# Patient Record
Sex: FEMALE | Race: WHITE | Employment: UNEMPLOYED | URBAN - METROPOLITAN AREA
[De-identification: names, ages, dates, MRNs, and addresses within clinical notes are randomized per-mention and may not be internally consistent; named-entity substitution may affect disease eponyms.]

---

## 2017-07-24 ENCOUNTER — HOSPITAL ENCOUNTER (OUTPATIENT)
Dept: PHYSICAL THERAPY | Age: 57
Discharge: HOME | End: 2017-07-24
Payer: SELF-PAY

## 2017-07-24 NOTE — PROGRESS NOTES
Xiomara Song  : 1960 [facility]  [address], [address], [place] [address]  [phone]   [phone]        OUTPATIENT DAILY NOTE    NAME/AGE/GENDER: Xiomara Song is a 64 y.o. female. DATE: 2017    This initial visit was canceled because patient is going to go to [facility] clinic instead and re-scheduled her evaluation there.     Roosevelt Lama, PT

## 2017-07-25 ENCOUNTER — HOSPITAL ENCOUNTER (OUTPATIENT)
Dept: PHYSICAL THERAPY | Age: 57
Discharge: HOME | End: 2017-07-25
Payer: SELF-PAY

## 2017-07-25 PROCEDURE — 97110 THERAPEUTIC EXERCISES: CPT

## 2017-07-25 PROCEDURE — 97161 PT EVAL LOW COMPLEX 20 MIN: CPT

## 2017-07-25 NOTE — PROGRESS NOTES
Allison Wu  : 1960 [address] [facility] at [address] [place] [address], [address]  [phone]   [phone]         OUTPATIENT PHYSICAL THERAPY:Initial Assessment 2017    ICD-10: Treatment Diagnosis: low back pain (M54.5); Radiculopathy, lumbar region (M54.16); difficulty walking (R26.2)  Precautions/Allergies:   Review of patient's allergies indicates not on file. Fall Risk Score: 1 (? 5 = High Risk)  MD Orders: evaluate and treat MEDICAL/REFERRING DIAGNOSIS:  Radiculopathy, lumbar region [M54.16]   DATE OF ONSET: surgery 17  REFERRING PHYSICIAN: Unknown, Provider, MD  RETURN PHYSICIAN APPOINTMENT: 2017     INITIAL ASSESSMENT:  Ms. Megan Muñoz presents with functional limitations due to lumbar pain and stiffness following discectomy 17. PT evaluation reveals weakness of core musculature, poor postural alignment, and lumbar tissue restriction. Pt states that she recently fell onto her low back (post surgery) reporting soreness from this incident. No radicular symptoms or signs and no signs observed that would suggest significant injury to surgical sight. She would highly benefit from skilled PT to address problems below and reach maximum functional mobility. PROBLEM LIST (Impacting functional limitations):  1. Decreased Strength  2. Decreased ADL/Functional Activities  3. Increased Pain  4. Decreased Flexibility/Joint Mobility  5. Decreased [place] with Home Exercise Program INTERVENTIONS PLANNED:  1. Balance Exercise  2. Bed Mobility  3. Cold  4. Gait Training  5. Heat  6. Home Exercise Program (HEP)  7. Manual Therapy  8. Neuromuscular Re-education/Strengthening  9. Range of Motion (ROM)  10. Therapeutic Exercise/Strengthening   TREATMENT PLAN:  Effective Dates: 17 TO 17.   Frequency/Duration: 2 times a week for 4-8 weeks  GOALS: (Goals have been discussed and agreed upon with patient.)  SHORT-TERM FUNCTIONAL GOALS: Time Frame: 2-4 weeks   1. Pt will be independent with HEP focusing on core stability and promoting good spinal alignment. 2. Pt will demonstrate good standing and sitting postural alignment with min to no cuing needed. 3. Pt will report pain level does not exceed 4/10 in a 1-2 week period of time to demonstrate pain management. 4. Pt will be educated on body mechanics during ADLs and IADLs to decrease stress upon low back during in home activities. 5. Pt will no longer use back brace for normal daily activities (may still use for strenuous activities). DISCHARGE GOALS: Time Frame: 6-8 weeks   1. Pt will improve Oswestry score by at least 10 points. 2. Pt will be able to sit to stand 5/5 times with no use of UE.   3. Pt will demonstrate improved AROM of lumbar spine all planes by at least 25% without report of pain to improve functional mobility. 3. Pt will be able to sustain regular exercise routine including walking for recreation 3 + times per week with no limitations due to lumbar pain. 4. Pt will demonstrate at least 4+/5 bilateral hip and TrA strength. Rehabilitation Potential For Stated Goals: Good  Regarding Sultana Velásquez's therapy, I certify that the treatment plan above will be carried out by a therapist or under their direction. Thank you for this referral,  Patrick Estevez, PT     Referring Physician Signature: Unknown, Provider, MD              Date                    The information in this section was collected on 7/25/17 (except where otherwise noted). HISTORY:   History of Present Injury/Illness (Reason for Referral):  Pt states 9 months chronic LBP right LE pain, treated with PT and injections prior to surgery on 6/28/17. No LE pain and min to mod back pain post surgery. Pt reports a fall while reaching for something in her closet 4 days ago, landing on back. She states significant low to mid back soreness since fall. She informed surgeon of fall.   No immediate signs of significant injury. Present symptoms (on day of initial evaluation): dull aching of low and mid back    · Aggravating factors: walking short distances, bending forward, sitting prolonged, sit to stand    · Relieving factors: hydrocodone, ice, and heat, wearing soft back brace   · Pain level: 5-6/10 presently, 10/10 worst, 2/10 best     Past Medical History/Comorbidities:   Anxiety, OA, pacemaker  Social History/Living Environment:     lives with spouse one story home   Prior Level of Function/Work/Activity:   (unable to work due to pain of low back pain)  Dominant Side:         RIGHT  Other Clinical Tests:          CT scan   Previous Treatment Approaches:          PT     Current Medications:     No current outpatient prescriptions on file. Date Last Reviewed:  7/25/2017     Number of Personal Factors/Comorbidities that affect the Plan of Care: 1-2: MODERATE COMPLEXITY   EXAMINATION:   Observation/Orthostatic Postural Assessment:    · Poor posture overall  · Flattened lumbar spine  · Posterior pelvic tilt   · Tends to keep B hips flexed with slight tilt forward during standing position  · Tends to lock knees into extension with standing activities.     · Wears heel lift for leg length discrepency     Palpation:    ·       MF tightness noted all directions superficial layers lumbar region   ROM:            Lumbar spine Date:  7/25/17 Date:   Date:     Direction  Parameters Parameters Parameters   Flexion  50%     Extension  nuetral cs     Rotation  R: 25% cs  L: 25%     Side bending  R: 25% cs LE  L: 25%      Hip IR R: mildly limited   L: mildly limited      Hip ER R: mildly limited   L: mildly limited              Strength:            Lower quadrant    DATE  7/25/17 DATE     Hip flexion R: 4  L: 4 R:   L:    Hip Abduction  R: 4  L: 4 R:   L:    Hip Extension  R: 4  L: 4 R:   L:    Knee Flexion  R: 4+  L: 4+ R:   L:    Knee Extension  R: 4+  L: 4+ R:   L:    Ankle Dorsiflexion  R: 4+  L: 4+ R:   L: Ankle Plantarflexion  R: 4+  L: 4+ R:  L:          Special Tests:          None   Neurological Screen:        Normal   Functional Mobility:         Gait/Ambulation:  Short step length B, decreased hip extension B         Transfers: heavy use B UE         Bed Mobility:  independent slow        Stairs:  Not tested today    Body Structures Involved:  1. Joints  2. Muscles Body Functions Affected:  1. Sensory/Pain  2. Neuromusculoskeletal  3. Movement Related Activities and Participation Affected:  1. Mobility  2. Self Care   Number of elements that affect the Plan of Care: 3: MODERATE COMPLEXITY   CLINICAL PRESENTATION:   Presentation: Stable and uncomplicated: LOW COMPLEXITY   CLINICAL DECISION MAKING:   Outcome Measure: Tool Used: Modified Oswestry Low Back Pain Questionnaire  Score:  Initial: 25/50  Most Recent: X/50 (Date: -- )   Interpretation of Score: Each section is scored on a 0-5 scale, 5 representing the greatest disability. The scores of each section are added together for a total score of 50. Medical Necessity:   · Patient is expected to demonstrate progress in strength, range of motion and functional technique to increase independence with self care and recreational activities. Reason for Services/Other Comments:  · Patient continues to require modification of therapeutic interventions to increase complexity of exercises. Use of outcome tool(s) and clinical judgement create a POC that gives a: Clear prediction of patient's progress: LOW COMPLEXITY            TREATMENT:   (In addition to Assessment/Re-Assessment sessions the following treatments were rendered)  Pre-treatment Symptoms/Complaints:  See above   Pain: Initial:     5-6/10 Post Session:  5/10     THERAPEUTIC EXERCISE: (10 minutes):  Exercises per grid below to improve mobility, strength, balance and coordination.   Required moderate visual, verbal, manual and tactile cues to promote proper body alignment, promote proper body posture, promote proper body mechanics and promote proper body breathing techniques. Progressed resistance, range, repetitions and complexity of movement as indicated. Date:  7/25/17 Date:   Date:     Activity/Exercise Parameters Parameters Parameters   multif (heel and elbow press) 5 sec x 5      iso hip flexor  5 sec x 5 (SL)     nustep/treadmill Next visit     bridge Next visit                            Treatment/Session Assessment:    · Response to Treatment:  Good understanding of HEP. · Compliance with Program/Exercises: Will assess as treatment progresses. · Recommendations/Intent for next treatment session: \"Next visit will focus on advancements to more challenging activities\".     Future Appointments  Date Time Provider Jennifer Chinchilla   7/27/2017 8:00 AM Whitney Ramirez PT [facility]   7/31/2017 8:00 AM IVETTE BernabeOSRPSUZE [facility]   8/2/2017 8:00 AM Whitney Ramirez PT [facility]       Total Treatment Duration:  PT Patient Time In/Time Out  Time In: 0800  Time Out: 0900    Whitney Ramirez PT

## 2017-07-26 NOTE — PROGRESS NOTES
Ambulatory/Rehab Services H2 Model Falls Risk Assessment    Risk Factor Pts. ·   Confusion/Disorientation/Impulsivity  []    4 ·   Symptomatic Depression  []   2 ·   Altered Elimination  []   1 ·   Dizziness/Vertigo  []   1 ·   Gender (Male)  []   1 ·   Any administered antiepileptics (anticonvulsants):  []   2 ·   Any administered benzodiazepines:  []   1 ·   Visual Impairment (specify):  []   1 ·   Portable Oxygen Use  []   1 ·   Orthostatic ? BP  []   1 ·   History of Recent Falls (within 3 mos.)  []   5     Ability to Rise from Chair (choose one) Pts. ·   Ability to rise in a single movement  []   0 ·   Pushes up, successful in one attempt  [x]   1 ·   Multiple attempts, but successful  []   3 ·   Unable to rise without assistance  []   4   Total: (5 or greater = High Risk) 1     Falls Prevention Plan:   []                Physical Limitations to Exercise (specify):   []                Mobility Assistance Device (type):   []                Exercise/Equipment Adaptation (specify):    ©2010 [facility] of [address] Patent #[id number].  Federal Law prohibits the replication, distribution or use without written permission from [facility]   '

## 2017-07-27 ENCOUNTER — HOSPITAL ENCOUNTER (OUTPATIENT)
Dept: PHYSICAL THERAPY | Age: 57
Discharge: HOME | End: 2017-07-27
Payer: SELF-PAY

## 2017-07-27 PROCEDURE — 97110 THERAPEUTIC EXERCISES: CPT

## 2017-07-27 NOTE — PROGRESS NOTES
Patricio Roche  : 1960 [address] at [address] [place] [address], [facility], [address] [place] [address]  [phone]   [phone]         OUTPATIENT PHYSICAL THERAPY:Daily Note 2017    ICD-10: Treatment Diagnosis: low back pain (M54.5); Radiculopathy, lumbar region (M54.16); difficulty walking (R26.2)  Precautions/Allergies:   Review of patient's allergies indicates not on file. Fall Risk Score: 1 (? 5 = High Risk)  MD Orders: evaluate and treat MEDICAL/REFERRING DIAGNOSIS:  Radiculopathy, lumbar region [M54.16]   DATE OF ONSET: surgery 17  REFERRING PHYSICIAN: Unknown, Provider, MD  RETURN PHYSICIAN APPOINTMENT: 2017     INITIAL ASSESSMENT:  Ms. Rajesh Reilly presents with functional limitations due to lumbar pain and stiffness following discectomy 17. PT evaluation reveals weakness of core musculature, poor postural alignment, and lumbar tissue restriction. Pt states that she recently fell onto her low back (post surgery) reporting soreness from this incident. No radicular symptoms or signs and no signs observed that would suggest significant injury to surgical sight. She would highly benefit from skilled PT to address problems below and reach maximum functional mobility. PROBLEM LIST (Impacting functional limitations):  1. Decreased Strength  2. Decreased ADL/Functional Activities  3. Increased Pain  4. Decreased Flexibility/Joint Mobility  5. Decreased [place] with Home Exercise Program INTERVENTIONS PLANNED:  1. Balance Exercise  2. Bed Mobility  3. Cold  4. Gait Training  5. Heat  6. Home Exercise Program (HEP)  7. Manual Therapy  8. Neuromuscular Re-education/Strengthening  9. Range of Motion (ROM)  10. Therapeutic Exercise/Strengthening   TREATMENT PLAN:  Effective Dates: 17 TO 17.   Frequency/Duration: 2 times a week for 4-8 weeks  GOALS: (Goals have been discussed and agreed upon with patient.)  SHORT-TERM FUNCTIONAL GOALS: Time Frame: 2-4 weeks   1. Pt will be independent with HEP focusing on core stability and promoting good spinal alignment. 2. Pt will demonstrate good standing and sitting postural alignment with min to no cuing needed. 3. Pt will report pain level does not exceed 4/10 in a 1-2 week period of time to demonstrate pain management. 4. Pt will be educated on body mechanics during ADLs and IADLs to decrease stress upon low back during in home activities. 5. Pt will no longer use back brace for normal daily activities (may still use for strenuous activities). DISCHARGE GOALS: Time Frame: 6-8 weeks   1. Pt will improve Oswestry score by at least 10 points. 2. Pt will be able to sit to stand 5/5 times with no use of UE.   3. Pt will demonstrate improved AROM of lumbar spine all planes by at least 25% without report of pain to improve functional mobility. 3. Pt will be able to sustain regular exercise routine including walking for recreation 3 + times per week with no limitations due to lumbar pain. 4. Pt will demonstrate at least 4+/5 bilateral hip and TrA strength. Rehabilitation Potential For Stated Goals: Good  Regarding Sultana Velásquez's therapy, I certify that the treatment plan above will be carried out by a therapist or under their direction. Thank you for this referral,  Jacky Lindsey, PT     Referring Physician Signature: Unknown, Provider, MD              Date                    The information in this section was collected on 7/25/17 (except where otherwise noted). HISTORY:   History of Present Injury/Illness (Reason for Referral):  Pt states 9 months chronic LBP right LE pain, treated with PT and injections prior to surgery on 6/28/17. No LE pain and min to mod back pain post surgery. Pt reports a fall while reaching for something in her closet 4 days ago, landing on back. She states significant low to mid back soreness since fall. She informed surgeon of fall.   No immediate signs of significant injury. Present symptoms (on day of initial evaluation): dull aching of low and mid back    · Aggravating factors: walking short distances, bending forward, sitting prolonged, sit to stand    · Relieving factors: hydrocodone, ice, and heat, wearing soft back brace   · Pain level: 5-6/10 presently, 10/10 worst, 2/10 best     Past Medical History/Comorbidities:   Anxiety, OA, pacemaker  Social History/Living Environment:     lives with spouse one story home   Prior Level of Function/Work/Activity:   (unable to work due to pain of low back pain)  Dominant Side:         RIGHT  Other Clinical Tests:          CT scan   Previous Treatment Approaches:          PT     Current Medications:     No current outpatient prescriptions on file. Date Last Reviewed:  7/27/2017     Number of Personal Factors/Comorbidities that affect the Plan of Care: 1-2: MODERATE COMPLEXITY   EXAMINATION:   Observation/Orthostatic Postural Assessment:    · Poor posture overall  · Flattened lumbar spine  · Posterior pelvic tilt   · Tends to keep B hips flexed with slight tilt forward during standing position  · Tends to lock knees into extension with standing activities.     · Wears heel lift for leg length discrepency     Palpation:    ·       MF tightness noted all directions superficial layers lumbar region   ROM:            Lumbar spine Date:  7/25/17 Date:   Date:     Direction  Parameters Parameters Parameters   Flexion  50%     Extension  nuetral cs     Rotation  R: 25% cs  L: 25%     Side bending  R: 25% cs LE  L: 25%      Hip IR R: mildly limited   L: mildly limited      Hip ER R: mildly limited   L: mildly limited              Strength:            Lower quadrant    DATE  7/25/17 DATE     Hip flexion R: 4  L: 4 R:   L:    Hip Abduction  R: 4  L: 4 R:   L:    Hip Extension  R: 4  L: 4 R:   L:    Knee Flexion  R: 4+  L: 4+ R:   L:    Knee Extension  R: 4+  L: 4+ R:   L:    Ankle Dorsiflexion  R: 4+  L: 4+ R:   L:   Ankle Plantarflexion  R: 4+  L: 4+ R:  L:          Special Tests:          None   Neurological Screen:        Normal   Functional Mobility:         Gait/Ambulation:  Short step length B, decreased hip extension B         Transfers: heavy use B UE         Bed Mobility:  independent slow        Stairs:  Not tested today    Body Structures Involved:  1. Joints  2. Muscles Body Functions Affected:  1. Sensory/Pain  2. Neuromusculoskeletal  3. Movement Related Activities and Participation Affected:  1. Mobility  2. Self Care   Number of elements that affect the Plan of Care: 3: MODERATE COMPLEXITY   CLINICAL PRESENTATION:   Presentation: Stable and uncomplicated: LOW COMPLEXITY   CLINICAL DECISION MAKING:   Outcome Measure: Tool Used: Modified Oswestry Low Back Pain Questionnaire  Score:  Initial: 25/50  Most Recent: X/50 (Date: -- )   Interpretation of Score: Each section is scored on a 0-5 scale, 5 representing the greatest disability. The scores of each section are added together for a total score of 50. Medical Necessity:   · Patient is expected to demonstrate progress in strength, range of motion and functional technique to increase independence with self care and recreational activities. Reason for Services/Other Comments:  · Patient continues to require modification of therapeutic interventions to increase complexity of exercises. TREATMENT:   (In addition to Assessment/Re-Assessment sessions the following treatments were rendered)  Pre-treatment Symptoms/Complaints:  Pt states that she is has been sore from exercises of HEP. She states due to soreness, she took a muscle relaxer this AM (3 hours ago) and came in noticeably very groggy. Pain: Initial:     6/10 Post Session:  5/10     THERAPEUTIC EXERCISE: (40 minutes):  Exercises per grid below to improve mobility, strength, balance and coordination.   Required moderate visual, verbal, manual and tactile cues to promote proper body alignment, promote proper body posture, promote proper body mechanics and promote proper body breathing techniques. Progressed resistance, range, repetitions and complexity of movement as indicated. Date:  7/25/17 Date:  7/27/17 Date:     Activity/Exercise Parameters Parameters Parameters   multif (heel and elbow press) 5 sec x 5  5 x 5 sec     iso hip flexor  5 sec x 5 (SL) X 2 B     nustep/treadmill Next visit X 10 min very slow, no resist      bridge Next visit  Next visit     Marching    X 1 lap slowly     Side stepping   X 1 lap    LTR  X 6 with assist     Modalities:   ice of lumbar region in supine to decrease inflammation x 10 min     Treatment/Session Assessment:  Required cuing with most exercises today. Very groggy from medication today. Advised against pt taking muscle relaxers before therapy sessions. · Response to Treatment: decreased pain level after session   · Compliance with Program/Exercises: Will assess as treatment progresses.   · Recommendations/Intent for next treatment session: continue standing exercises, manual     Future Appointments  Date Time Provider Jennifer Chinchilla   7/31/2017 8:00 AM IVETTE Ariza   8/2/2017 8:00 AM IVETTE Ariza       Total Treatment Duration:  PT Patient Time In/Time Out  Time In: 0800  Time Out: 0900    Nathaniel Doty PT

## 2017-07-31 ENCOUNTER — HOSPITAL ENCOUNTER (OUTPATIENT)
Dept: PHYSICAL THERAPY | Age: 57
Discharge: HOME | End: 2017-07-31
Payer: SELF-PAY

## 2017-07-31 PROCEDURE — 97110 THERAPEUTIC EXERCISES: CPT

## 2017-07-31 PROCEDURE — 97140 MANUAL THERAPY 1/> REGIONS: CPT

## 2017-07-31 NOTE — PROGRESS NOTES
Bambi Mode  : 1960 [address] [facility] at [address] [place] [address] [address] [place] [address], [facility], [address] [place] [address]  [phone]   [phone]         OUTPATIENT PHYSICAL THERAPY:Daily Note 2017    ICD-10: Treatment Diagnosis: low back pain (M54.5); Radiculopathy, lumbar region (M54.16); difficulty walking (R26.2)  Precautions/Allergies:   Review of patient's allergies indicates not on file. Fall Risk Score: 1 (? 5 = High Risk)  MD Orders: evaluate and treat MEDICAL/REFERRING DIAGNOSIS:  Radiculopathy, lumbar region [M54.16]   DATE OF ONSET: surgery 17  REFERRING PHYSICIAN: Unknown, Provider, MD  RETURN PHYSICIAN APPOINTMENT: 2017     INITIAL ASSESSMENT:  Ms. Charlotte Miller presents with functional limitations due to lumbar pain and stiffness following discectomy 17. PT evaluation reveals weakness of core musculature, poor postural alignment, and lumbar tissue restriction. Pt states that she recently fell onto her low back (post surgery) reporting soreness from this incident. No radicular symptoms or signs and no signs observed that would suggest significant injury to surgical sight. She would highly benefit from skilled PT to address problems below and reach maximum functional mobility. PROBLEM LIST (Impacting functional limitations):  1. Decreased Strength  2. Decreased ADL/Functional Activities  3. Increased Pain  4. Decreased Flexibility/Joint Mobility  5. Decreased [place] with Home Exercise Program INTERVENTIONS PLANNED:  1. Balance Exercise  2. Bed Mobility  3. Cold  4. Gait Training  5. Heat  6. Home Exercise Program (HEP)  7. Manual Therapy  8. Neuromuscular Re-education/Strengthening  9. Range of Motion (ROM)  10. Therapeutic Exercise/Strengthening   TREATMENT PLAN:  Effective Dates: 17 TO 17.   Frequency/Duration: 2 times a week for 4-8 weeks  GOALS: (Goals have been discussed and agreed upon with patient.)  SHORT-TERM FUNCTIONAL GOALS: Time Frame: 2-4 weeks   1. Pt will be independent with HEP focusing on core stability and promoting good spinal alignment. 2. Pt will demonstrate good standing and sitting postural alignment with min to no cuing needed. 3. Pt will report pain level does not exceed 4/10 in a 1-2 week period of time to demonstrate pain management. 4. Pt will be educated on body mechanics during ADLs and IADLs to decrease stress upon low back during in home activities. 5. Pt will no longer use back brace for normal daily activities (may still use for strenuous activities). DISCHARGE GOALS: Time Frame: 6-8 weeks   1. Pt will improve Oswestry score by at least 10 points. 2. Pt will be able to sit to stand 5/5 times with no use of UE.   3. Pt will demonstrate improved AROM of lumbar spine all planes by at least 25% without report of pain to improve functional mobility. 3. Pt will be able to sustain regular exercise routine including walking for recreation 3 + times per week with no limitations due to lumbar pain. 4. Pt will demonstrate at least 4+/5 bilateral hip and TrA strength. Rehabilitation Potential For Stated Goals: Good  Regarding Sultana Velásquez's therapy, I certify that the treatment plan above will be carried out by a therapist or under their direction. Thank you for this referral,  Wade George PT     Referring Physician Signature: Unknown, Provider, MD              Date                    The information in this section was collected on 7/25/17 (except where otherwise noted). HISTORY:   History of Present Injury/Illness (Reason for Referral):  Pt states 9 months chronic LBP right LE pain, treated with PT and injections prior to surgery on 6/28/17. No LE pain and min to mod back pain post surgery. Pt reports a fall while reaching for something in her closet 4 days ago, landing on back. She states significant low to mid back soreness since fall. She informed surgeon of fall.   No immediate signs of significant injury. Present symptoms (on day of initial evaluation): dull aching of low and mid back    · Aggravating factors: walking short distances, bending forward, sitting prolonged, sit to stand    · Relieving factors: hydrocodone, ice, and heat, wearing soft back brace   · Pain level: 5-6/10 presently, 10/10 worst, 2/10 best     Past Medical History/Comorbidities:   Anxiety, OA, pacemaker  Social History/Living Environment:     lives with spouse one story home   Prior Level of Function/Work/Activity:   (unable to work due to pain of low back pain)  Dominant Side:         RIGHT  Other Clinical Tests:          CT scan   Previous Treatment Approaches:          PT     Current Medications:     No current outpatient prescriptions on file. Date Last Reviewed:  7/31/2017     Number of Personal Factors/Comorbidities that affect the Plan of Care: 1-2: MODERATE COMPLEXITY   EXAMINATION:   Observation/Orthostatic Postural Assessment:    · Poor posture overall  · Flattened lumbar spine  · Posterior pelvic tilt   · Tends to keep B hips flexed with slight tilt forward during standing position  · Tends to lock knees into extension with standing activities.     · Wears heel lift for leg length discrepency     Palpation:    ·       MF tightness noted all directions superficial layers lumbar region   ROM:            Lumbar spine Date:  7/25/17 Date:   Date:     Direction  Parameters Parameters Parameters   Flexion  50%     Extension  nuetral cs     Rotation  R: 25% cs  L: 25%     Side bending  R: 25% cs LE  L: 25%      Hip IR R: mildly limited   L: mildly limited      Hip ER R: mildly limited   L: mildly limited              Strength:            Lower quadrant    DATE  7/25/17 DATE     Hip flexion R: 4  L: 4 R:   L:    Hip Abduction  R: 4  L: 4 R:   L:    Hip Extension  R: 4  L: 4 R:   L:    Knee Flexion  R: 4+  L: 4+ R:   L:    Knee Extension  R: 4+  L: 4+ R:   L:    Ankle Dorsiflexion  R: 4+  L: 4+ R:   L:   Ankle Plantarflexion  R: 4+  L: 4+ R:  L:          Special Tests:          None   Neurological Screen:        Normal   Functional Mobility:         Gait/Ambulation:  Short step length B, decreased hip extension B         Transfers: heavy use B UE         Bed Mobility:  independent slow        Stairs:  Not tested today    Body Structures Involved:  1. Joints  2. Muscles Body Functions Affected:  1. Sensory/Pain  2. Neuromusculoskeletal  3. Movement Related Activities and Participation Affected:  1. Mobility  2. Self Care   Number of elements that affect the Plan of Care: 3: MODERATE COMPLEXITY   CLINICAL PRESENTATION:   Presentation: Stable and uncomplicated: LOW COMPLEXITY   CLINICAL DECISION MAKING:   Outcome Measure: Tool Used: Modified Oswestry Low Back Pain Questionnaire  Score:  Initial: 25/50  Most Recent: X/50 (Date: -- )   Interpretation of Score: Each section is scored on a 0-5 scale, 5 representing the greatest disability. The scores of each section are added together for a total score of 50. Medical Necessity:   · Patient is expected to demonstrate progress in strength, range of motion and functional technique to increase independence with self care and recreational activities. Reason for Services/Other Comments:  · Patient continues to require modification of therapeutic interventions to increase complexity of exercises. TREATMENT:   (In addition to Assessment/Re-Assessment sessions the following treatments were rendered)  Pre-treatment Symptoms/Complaints:  Pt reports that she was very sore all weekend of posterior right hip. Appears very stiff during ambulation upon arrival. continues to cross legs in seated position and states that she is sitting a lot. Pain: Initial:     7/10 right hip  Post Session:  No number given but improved mobility upon departure. THERAPEUTIC EXERCISE: (10 minutes):  Exercises per grid below to improve mobility, strength, balance and coordination. Required moderate visual, verbal, manual and tactile cues to promote proper body alignment, promote proper body posture, promote proper body mechanics and promote proper body breathing techniques. Progressed resistance, range, repetitions and complexity of movement as indicated. Date:  7/25/17 Date:  7/27/17 Date:  7/31/17   Activity/Exercise Parameters Parameters Parameters   multif (heel and elbow press) 5 sec x 5  5 x 5 sec     iso hip flexor  5 sec x 5 (SL) X 2 B     nustep/treadmill Next visit X 10 min very slow, no resist      bridge Next visit  Next visit  Not today   Marching    X 1 lap slowly  Not today   Side stepping   X 1 lap Not today   LTR  X 6 with assist  X 5 slowly    Diaphragmatic breathing    X 5 min    Prone lying   During manual; gave for HEP 5 min 2/day               Manual techniques: 30 min to improve lumbar and hip joint mechanics  · Superficial MFR, gentle, low thoracic to low lumbar. · Right hip ROM and rotation stretching    · Transverse mobs lumbar vert grade I and II    Modalities:  Heat x 10 min pre manual and exercise in prone to promote relaxation and decrease joint and tissue stiffness. ice of lumbar region in supine to decrease inflammation x 10 min     Treatment/Session Assessment: significant difficulty relaxing today. Pain increased with most positions and activities. Demonstrated significant tightness of right iliopsoas. · Response to Treatment: pt with mildly decreased pain and increased mobility after session today. Good understanding of HEP added above. · Compliance with Program/Exercises: Will assess as treatment progresses. · Recommendations/Intent for next treatment session: continue manual and promoting elongation of hip flexors, work on TrA with stabilizer.     Future Appointments  Date Time Provider Jennifer Chinchilla   8/2/2017 8:00 AM Marta Bryan, PT [facility] AND [facility]       Total Treatment Duration:  PT Patient Time In/Time Out  Time In: 0800  Time Out: 0900    Antony Soler, PT

## 2017-08-04 ENCOUNTER — HOSPITAL ENCOUNTER (OUTPATIENT)
Dept: PHYSICAL THERAPY | Age: 57
Discharge: HOME | End: 2017-08-04
Payer: COMMERCIAL

## 2017-08-04 PROCEDURE — 97110 THERAPEUTIC EXERCISES: CPT

## 2017-08-04 NOTE — PROGRESS NOTES
Jese Garnett  : 1960 [address] [facility] [address] at [place] [address] [address] [facility], [facility], [address] [place] [address]  [phone]   [phone]         OUTPATIENT PHYSICAL THERAPY:Daily Note 2017    ICD-10: Treatment Diagnosis: low back pain (M54.5); Radiculopathy, lumbar region (M54.16); difficulty walking (R26.2)  Precautions/Allergies:   Review of patient's allergies indicates not on file. Fall Risk Score: 1 (? 5 = High Risk)  MD Orders: evaluate and treat MEDICAL/REFERRING DIAGNOSIS:  Radiculopathy, lumbar region [M54.16]   DATE OF ONSET: surgery 17  REFERRING PHYSICIAN: Unknown, Provider, MD  RETURN PHYSICIAN APPOINTMENT: 2017     INITIAL ASSESSMENT:  Ms. Adriel Vergara presents with functional limitations due to lumbar pain and stiffness following discectomy 17. PT evaluation reveals weakness of core musculature, poor postural alignment, and lumbar tissue restriction. Pt states that she recently fell onto her low back (post surgery) reporting soreness from this incident. No radicular symptoms or signs and no signs observed that would suggest significant injury to surgical sight. She would highly benefit from skilled PT to address problems below and reach maximum functional mobility. PROBLEM LIST (Impacting functional limitations):  1. Decreased Strength  2. Decreased ADL/Functional Activities  3. Increased Pain  4. Decreased Flexibility/Joint Mobility  5. Decreased [place] with Home Exercise Program INTERVENTIONS PLANNED:  1. Balance Exercise  2. Bed Mobility  3. Cold  4. Gait Training  5. Heat  6. Home Exercise Program (HEP)  7. Manual Therapy  8. Neuromuscular Re-education/Strengthening  9. Range of Motion (ROM)  10. Therapeutic Exercise/Strengthening   TREATMENT PLAN:  Effective Dates: 17 TO 17.   Frequency/Duration: 2 times a week for 4-8 weeks  GOALS: (Goals have been discussed and agreed upon with patient.)  SHORT-TERM FUNCTIONAL GOALS: Time Frame: 2-4 weeks   1. Pt will be independent with HEP focusing on core stability and promoting good spinal alignment. 2. Pt will demonstrate good standing and sitting postural alignment with min to no cuing needed. 3. Pt will report pain level does not exceed 4/10 in a 1-2 week period of time to demonstrate pain management. 4. Pt will be educated on body mechanics during ADLs and IADLs to decrease stress upon low back during in home activities. 5. Pt will no longer use back brace for normal daily activities (may still use for strenuous activities). DISCHARGE GOALS: Time Frame: 6-8 weeks   1. Pt will improve Oswestry score by at least 10 points. 2. Pt will be able to sit to stand 5/5 times with no use of UE.   3. Pt will demonstrate improved AROM of lumbar spine all planes by at least 25% without report of pain to improve functional mobility. 3. Pt will be able to sustain regular exercise routine including walking for recreation 3 + times per week with no limitations due to lumbar pain. 4. Pt will demonstrate at least 4+/5 bilateral hip and TrA strength. Rehabilitation Potential For Stated Goals: Good  Regarding Sultana Velásquez's therapy, I certify that the treatment plan above will be carried out by a therapist or under their direction. Thank you for this referral,  Catarina Talamantes, PT     Referring Physician Signature: Unknown, Provider, MD              Date                    The information in this section was collected on 7/25/17 (except where otherwise noted). HISTORY:   History of Present Injury/Illness (Reason for Referral):  Pt states 9 months chronic LBP right LE pain, treated with PT and injections prior to surgery on 6/28/17. No LE pain and min to mod back pain post surgery. Pt reports a fall while reaching for something in her closet 4 days ago, landing on back. She states significant low to mid back soreness since fall. She informed surgeon of fall.   No immediate signs of significant injury. Present symptoms (on day of initial evaluation): dull aching of low and mid back    · Aggravating factors: walking short distances, bending forward, sitting prolonged, sit to stand    · Relieving factors: hydrocodone, ice, and heat, wearing soft back brace   · Pain level: 5-6/10 presently, 10/10 worst, 2/10 best     Past Medical History/Comorbidities:   Anxiety, OA, pacemaker  Social History/Living Environment:     lives with spouse one story home   Prior Level of Function/Work/Activity:   (unable to work due to pain of low back pain)  Dominant Side:         RIGHT  Other Clinical Tests:          CT scan   Previous Treatment Approaches:          PT     Current Medications:     No current outpatient prescriptions on file. Date Last Reviewed:  8/4/2017     Number of Personal Factors/Comorbidities that affect the Plan of Care: 1-2: MODERATE COMPLEXITY   EXAMINATION:   Observation/Orthostatic Postural Assessment:    · Poor posture overall  · Flattened lumbar spine  · Posterior pelvic tilt   · Tends to keep B hips flexed with slight tilt forward during standing position  · Tends to lock knees into extension with standing activities.     · Wears heel lift for leg length discrepency     Palpation:    ·       MF tightness noted all directions superficial layers lumbar region   ROM:            Lumbar spine Date:  7/25/17 Date:   Date:     Direction  Parameters Parameters Parameters   Flexion  50%     Extension  nuetral cs     Rotation  R: 25% cs  L: 25%     Side bending  R: 25% cs LE  L: 25%      Hip IR R: mildly limited   L: mildly limited      Hip ER R: mildly limited   L: mildly limited              Strength:            Lower quadrant    DATE  7/25/17 DATE     Hip flexion R: 4  L: 4 R:   L:    Hip Abduction  R: 4  L: 4 R:   L:    Hip Extension  R: 4  L: 4 R:   L:    Knee Flexion  R: 4+  L: 4+ R:   L:    Knee Extension  R: 4+  L: 4+ R:   L:    Ankle Dorsiflexion  R: 4+  L: 4+ R:   L:   Ankle Plantarflexion  R: 4+  L: 4+ R:  L:          Special Tests:          None   Neurological Screen:        Normal   Functional Mobility:         Gait/Ambulation:  Short step length B, decreased hip extension B         Transfers: heavy use B UE         Bed Mobility:  independent slow        Stairs:  Not tested today    Body Structures Involved:  1. Joints  2. Muscles Body Functions Affected:  1. Sensory/Pain  2. Neuromusculoskeletal  3. Movement Related Activities and Participation Affected:  1. Mobility  2. Self Care   Number of elements that affect the Plan of Care: 3: MODERATE COMPLEXITY   CLINICAL PRESENTATION:   Presentation: Stable and uncomplicated: LOW COMPLEXITY   CLINICAL DECISION MAKING:   Outcome Measure: Tool Used: Modified Oswestry Low Back Pain Questionnaire  Score:  Initial: 25/50  Most Recent: X/50 (Date: -- )   Interpretation of Score: Each section is scored on a 0-5 scale, 5 representing the greatest disability. The scores of each section are added together for a total score of 50. Medical Necessity:   · Patient is expected to demonstrate progress in strength, range of motion and functional technique to increase independence with self care and recreational activities. Reason for Services/Other Comments:  · Patient continues to require modification of therapeutic interventions to increase complexity of exercises. TREATMENT:   (In addition to Assessment/Re-Assessment sessions the following treatments were rendered)  Pre-treatment Symptoms/Complaints: Pt states that she has been very sore over last few days because of the spasm she was in when she turned over on the mat during last session. She statest that she was consistent with her exercises though and is less fearful of movement. Pain: Initial:     7/10 right hip  Post Session:  6     THERAPEUTIC EXERCISE: (30 minutes):  Exercises per grid below to improve mobility, strength, balance and coordination.   Required moderate visual, verbal, manual and tactile cues to promote proper body alignment, promote proper body posture, promote proper body mechanics and promote proper body breathing techniques. Progressed resistance, range, repetitions and complexity of movement as indicated. Date:  7/25/17 Date:  7/27/17 Date:  7/31/17 8/4/17   Activity/Exercise Parameters Parameters Parameters    multif (heel and elbow press) 5 sec x 5  5 x 5 sec      iso hip flexor  5 sec x 5 (SL) X 2 B      nustep/treadmill Next visit X 10 min very slow, no resist    X 10 min 1.5  slow    bridge Next visit  Next visit  Not today    Marching    X 1 lap slowly  Not today    Side stepping   X 1 lap Not today    LTR  X 6 with assist  X 5 slowly     Diaphragmatic breathing    X 5 min  X 3 min at beginning of exercises    Prone lying   During manual; gave for HEP 5 min 2/day    kegals    X 15 min with different techniques (iso hold and elevator), hold 5-10 sec    Pelvic tilt     X 5    Manual techniques: 0 min to improve lumbar and hip joint mechanics (not today)  · Superficial MFR, gentle, low thoracic to low lumbar. · Right hip ROM and rotation stretching    · Transverse mobs lumbar vert grade I and II     Modalities:    ice of lumbar region in supine to decrease inflammation x 10 min     Treatment/Session Assessment: tolerated exercises well today. · Response to Treatment: pt with good understanding of new exercises. · Compliance with Program/Exercises: Will assess as treatment progresses. · Recommendations/Intent for next treatment session: continue manual and promoting elongation of hip flexors, work on TrA with stabilizer. No future appointments.     Total Treatment Duration:  PT Patient Time In/Time Out  Time In: 0900  Time Out: [address] [facility] Elissa, IVETTE

## 2017-08-08 ENCOUNTER — HOSPITAL ENCOUNTER (OUTPATIENT)
Dept: PHYSICAL THERAPY | Age: 57
Discharge: HOME | End: 2017-08-08
Payer: COMMERCIAL

## 2017-08-08 PROCEDURE — 97110 THERAPEUTIC EXERCISES: CPT

## 2017-08-08 NOTE — PROGRESS NOTES
Blayne Ang  : 1960 [address] at [place] [address] [address] [facility], [address]  [phone]   [phone]         OUTPATIENT PHYSICAL THERAPY:Daily Note 2017    ICD-10: Treatment Diagnosis: low back pain (M54.5); Radiculopathy, lumbar region (M54.16); difficulty walking (R26.2)  Precautions/Allergies:   Review of patient's allergies indicates not on file. Fall Risk Score: 1 (? 5 = High Risk)  MD Orders: evaluate and treat MEDICAL/REFERRING DIAGNOSIS:  Radiculopathy, lumbar region [M54.16]   DATE OF ONSET: surgery 17  REFERRING PHYSICIAN: Unknown, Provider, MD  RETURN PHYSICIAN APPOINTMENT: 2017     INITIAL ASSESSMENT:  Ms. Austin Brown presents with functional limitations due to lumbar pain and stiffness following discectomy 17. PT evaluation reveals weakness of core musculature, poor postural alignment, and lumbar tissue restriction. Pt states that she recently fell onto her low back (post surgery) reporting soreness from this incident. No radicular symptoms or signs and no signs observed that would suggest significant injury to surgical sight. She would highly benefit from skilled PT to address problems below and reach maximum functional mobility. PROBLEM LIST (Impacting functional limitations):  1. Decreased Strength  2. Decreased ADL/Functional Activities  3. Increased Pain  4. Decreased Flexibility/Joint Mobility  5. Decreased [place] with Home Exercise Program INTERVENTIONS PLANNED:  1. Balance Exercise  2. Bed Mobility  3. Cold  4. Gait Training  5. Heat  6. Home Exercise Program (HEP)  7. Manual Therapy  8. Neuromuscular Re-education/Strengthening  9. Range of Motion (ROM)  10. Therapeutic Exercise/Strengthening   TREATMENT PLAN:  Effective Dates: 17 TO 17.   Frequency/Duration: 2 times a week for 4-8 weeks  GOALS: (Goals have been discussed and agreed upon with patient.)  SHORT-TERM FUNCTIONAL GOALS: Time Frame: 2-4 weeks   1. Pt will be independent with HEP focusing on core stability and promoting good spinal alignment. 2. Pt will demonstrate good standing and sitting postural alignment with min to no cuing needed. 3. Pt will report pain level does not exceed 4/10 in a 1-2 week period of time to demonstrate pain management. 4. Pt will be educated on body mechanics during ADLs and IADLs to decrease stress upon low back during in home activities. 5. Pt will no longer use back brace for normal daily activities (may still use for strenuous activities). DISCHARGE GOALS: Time Frame: 6-8 weeks   1. Pt will improve Oswestry score by at least 10 points. 2. Pt will be able to sit to stand 5/5 times with no use of UE.   3. Pt will demonstrate improved AROM of lumbar spine all planes by at least 25% without report of pain to improve functional mobility. 3. Pt will be able to sustain regular exercise routine including walking for recreation 3 + times per week with no limitations due to lumbar pain. 4. Pt will demonstrate at least 4+/5 bilateral hip and TrA strength. Rehabilitation Potential For Stated Goals: Good  Regarding Sultana Velásquez's therapy, I certify that the treatment plan above will be carried out by a therapist or under their direction. Thank you for this referral,  Nathaniel Doty, PT     Referring Physician Signature: Unknown, Provider, MD              Date                    The information in this section was collected on 7/25/17 (except where otherwise noted). HISTORY:   History of Present Injury/Illness (Reason for Referral):  Pt states 9 months chronic LBP right LE pain, treated with PT and injections prior to surgery on 6/28/17. No LE pain and min to mod back pain post surgery. Pt reports a fall while reaching for something in her closet 4 days ago, landing on back. She states significant low to mid back soreness since fall. She informed surgeon of fall.   No immediate signs of significant injury. Present symptoms (on day of initial evaluation): dull aching of low and mid back    · Aggravating factors: walking short distances, bending forward, sitting prolonged, sit to stand    · Relieving factors: hydrocodone, ice, and heat, wearing soft back brace   · Pain level: 5-6/10 presently, 10/10 worst, 2/10 best     Past Medical History/Comorbidities:   Anxiety, OA, pacemaker  Social History/Living Environment:     lives with spouse one story home   Prior Level of Function/Work/Activity:   (unable to work due to pain of low back pain)  Dominant Side:         RIGHT  Other Clinical Tests:          CT scan   Previous Treatment Approaches:          PT     Current Medications:     No current outpatient prescriptions on file. Date Last Reviewed:  8/8/2017     EXAMINATION:   Observation/Orthostatic Postural Assessment:    · Poor posture overall  · Flattened lumbar spine  · Posterior pelvic tilt   · Tends to keep B hips flexed with slight tilt forward during standing position  · Tends to lock knees into extension with standing activities.     · Wears heel lift for leg length discrepency     Palpation:    ·       MF tightness noted all directions superficial layers lumbar region   ROM:            Lumbar spine Date:  7/25/17 Date:   Date:     Direction  Parameters Parameters Parameters   Flexion  50%     Extension  nuetral cs     Rotation  R: 25% cs  L: 25%     Side bending  R: 25% cs LE  L: 25%      Hip IR R: mildly limited   L: mildly limited      Hip ER R: mildly limited   L: mildly limited              Strength:            Lower quadrant    DATE  7/25/17 DATE     Hip flexion R: 4  L: 4 R:   L:    Hip Abduction  R: 4  L: 4 R:   L:    Hip Extension  R: 4  L: 4 R:   L:    Knee Flexion  R: 4+  L: 4+ R:   L:    Knee Extension  R: 4+  L: 4+ R:   L:    Ankle Dorsiflexion  R: 4+  L: 4+ R:   L:   Ankle Plantarflexion  R: 4+  L: 4+ R:  L:          Special Tests:          None   Neurological Screen: Normal   Functional Mobility:         Gait/Ambulation:  Short step length B, decreased hip extension B         Transfers: heavy use B UE         Bed Mobility:  independent slow        Stairs:  Not tested today    Outcome Measure: Tool Used: Modified Oswestry Low Back Pain Questionnaire  Score:  Initial: 25/50  Most Recent: X/50 (Date: -- )   Interpretation of Score: Each section is scored on a 0-5 scale, 5 representing the greatest disability. The scores of each section are added together for a total score of 50. Medical Necessity:   · Patient is expected to demonstrate progress in strength, range of motion and functional technique to increase independence with self care and recreational activities. Reason for Services/Other Comments:  · Patient continues to require modification of therapeutic interventions to increase complexity of exercises. TREATMENT:   (In addition to Assessment/Re-Assessment sessions the following treatments were rendered)  Pre-treatment Symptoms/Complaints: pt states she she started feeling better yesterday. Much less low back and right hip pain. Consistent with HEP         Pain: Initial:     3/10 right hip  Post Session:  3/10     THERAPEUTIC EXERCISE: (40 minutes):  Exercises per grid below to improve mobility, strength, balance and coordination. Required moderate visual, verbal, manual and tactile cues to promote proper body alignment, promote proper body posture, promote proper body mechanics and promote proper body breathing techniques. Progressed resistance, range, repetitions and complexity of movement as indicated.    Date:  7/25/17 Date:  7/27/17 Date:  7/31/17 8/4/17 8/8/17   Activity/Exercise Parameters Parameters Parameters     multif (heel and elbow press) 5 sec x 5  5 x 5 sec    5 sec x 5 reps   iso hip flexor  5 sec x 5 (SL) X 2 B       nustep/treadmill Next visit X 10 min very slow, no resist    X 10 min 1.5  slow  X 10 min level 2.0 good movement bridge Next visit  Next visit  Not today  X 4 between exercises    Marching    X 1 lap slowly  Not today     Side stepping   X 1 lap Not today     LTR  X 6 with assist  X 5 slowly      Diaphragmatic breathing    X 5 min  X 3 min at beginning of exercises     Prone lying   During manual; gave for HEP 5 min 2/day     kegals    X 15 min with different techniques (iso hold and elevator), hold 5-10 sec  3 rounds of elevator exercise; utilized kegal with all pelvic tilt     Pelvic tilt     X 5  With stabilizer x 15 min                            Manual techniques: 0 min to improve lumbar and hip joint mechanics (not today)  · Superficial MFR, gentle, low thoracic to low lumbar. · Right hip ROM and rotation stretching    · Transverse mobs lumbar vert grade I and II     Modalities:    ice of lumbar region in supine to decrease inflammation x 10 min     Treatment/Session Assessment: tolerated exercises well today. Cuing for TrA iso during pelvic tilting. · Response to Treatment: pt with good understanding of new exercises. · Compliance with Program/Exercises: Will assess as treatment progresses. · Recommendations/Intent for next treatment session: continue manual and promoting elongation of hip flexors,continue to work on TrA with stabilizer.     Future Appointments  Date Time Provider Jennifer Chinchilla   8/10/2017 10:00 AM William Roper, PT [facility] AND [facility]       Total Treatment Duration:  PT Patient Time In/Time Out  Time In: 0900  Time Out: [id number], PT

## 2017-08-10 ENCOUNTER — HOSPITAL ENCOUNTER (OUTPATIENT)
Dept: PHYSICAL THERAPY | Age: 57
Discharge: HOME | End: 2017-08-10
Payer: COMMERCIAL

## 2017-08-10 NOTE — PROGRESS NOTES
Arleth Sargent  : 1960 [address] at [place] [address] [address] [facility], [address]  [phone]   [phone]        OUTPATIENT DAILY NOTE    NAME/AGE/GENDER: Arleth Sargent is a 64 y.o. female. DATE: 8/10/2017    Patient cancelled for appointment today due to \"hurting too badly\". She was encouraged to attend therapy to aid with pain management and pt declined. Will plan to follow up on next scheduled visit.     Delphine Haas, PT

## 2017-08-21 ENCOUNTER — HOSPITAL ENCOUNTER (OUTPATIENT)
Dept: PHYSICAL THERAPY | Age: 57
Discharge: HOME | End: 2017-08-21
Payer: COMMERCIAL

## 2017-08-21 PROCEDURE — 97110 THERAPEUTIC EXERCISES: CPT

## 2017-08-21 PROCEDURE — 97140 MANUAL THERAPY 1/> REGIONS: CPT

## 2017-08-21 NOTE — PROGRESS NOTES
Maegan Duenas  : 1960 [address] at [place] [address] [address] [place] [address], [facility], [address] [place] [address]  [phone]   [phone]         OUTPATIENT PHYSICAL THERAPY:Daily Note 2017    ICD-10: Treatment Diagnosis: low back pain (M54.5); Radiculopathy, lumbar region (M54.16); difficulty walking (R26.2)  Precautions/Allergies:   Review of patient's allergies indicates not on file. Fall Risk Score: 1 (? 5 = High Risk)  MD Orders: evaluate and treat MEDICAL/REFERRING DIAGNOSIS:  Radiculopathy, lumbar region [M54.16]   DATE OF ONSET: surgery 17  REFERRING PHYSICIAN: Vikas Finn MD  RETURN PHYSICIAN APPOINTMENT: 2017     INITIAL ASSESSMENT:  Ms. Courtney Chen presents with functional limitations due to lumbar pain and stiffness following discectomy 17. PT evaluation reveals weakness of core musculature, poor postural alignment, and lumbar tissue restriction. Pt states that she recently fell onto her low back (post surgery) reporting soreness from this incident. No radicular symptoms or signs and no signs observed that would suggest significant injury to surgical sight. She would highly benefit from skilled PT to address problems below and reach maximum functional mobility. PROBLEM LIST (Impacting functional limitations):  1. Decreased Strength  2. Decreased ADL/Functional Activities  3. Increased Pain  4. Decreased Flexibility/Joint Mobility  5. Decreased [place] with Home Exercise Program INTERVENTIONS PLANNED:  1. Balance Exercise  2. Bed Mobility  3. Cold  4. Gait Training  5. Heat  6. Home Exercise Program (HEP)  7. Manual Therapy  8. Neuromuscular Re-education/Strengthening  9. Range of Motion (ROM)  10. Therapeutic Exercise/Strengthening   TREATMENT PLAN:  Effective Dates: 17 TO 17.   Frequency/Duration: 2 times a week for 4-8 weeks  GOALS: (Goals have been discussed and agreed upon with patient.)  SHORT-TERM FUNCTIONAL GOALS: Time Frame: 2-4 weeks   1. Pt will be independent with HEP focusing on core stability and promoting good spinal alignment. 2. Pt will demonstrate good standing and sitting postural alignment with min to no cuing needed. 3. Pt will report pain level does not exceed 4/10 in a 1-2 week period of time to demonstrate pain management. 4. Pt will be educated on body mechanics during ADLs and IADLs to decrease stress upon low back during in home activities. 5. Pt will no longer use back brace for normal daily activities (may still use for strenuous activities). DISCHARGE GOALS: Time Frame: 6-8 weeks   1. Pt will improve Oswestry score by at least 10 points. 2. Pt will be able to sit to stand 5/5 times with no use of UE.   3. Pt will demonstrate improved AROM of lumbar spine all planes by at least 25% without report of pain to improve functional mobility. 3. Pt will be able to sustain regular exercise routine including walking for recreation 3 + times per week with no limitations due to lumbar pain. 4. Pt will demonstrate at least 4+/5 bilateral hip and TrA strength. Rehabilitation Potential For Stated Goals: Good  Regarding Sultana Velásquez's therapy, I certify that the treatment plan above will be carried out by a therapist or under their direction. Thank you for this referral,  Patrick Estevez, PT     Referring Physician Signature: Jerry Salazar MD              Date                    The information in this section was collected on 7/25/17 (except where otherwise noted). HISTORY:   History of Present Injury/Illness (Reason for Referral):  Pt states 9 months chronic LBP right LE pain, treated with PT and injections prior to surgery on 6/28/17. No LE pain and min to mod back pain post surgery. Pt reports a fall while reaching for something in her closet 4 days ago, landing on back. She states significant low to mid back soreness since fall. She informed surgeon of fall.   No immediate signs of significant injury. Present symptoms (on day of initial evaluation): dull aching of low and mid back    · Aggravating factors: walking short distances, bending forward, sitting prolonged, sit to stand    · Relieving factors: hydrocodone, ice, and heat, wearing soft back brace   · Pain level: 5-6/10 presently, 10/10 worst, 2/10 best     Past Medical History/Comorbidities:   Anxiety, OA, pacemaker  Social History/Living Environment:     lives with spouse one story home   Prior Level of Function/Work/Activity:   (unable to work due to pain of low back pain)  Dominant Side:         RIGHT  Other Clinical Tests:          CT scan   Previous Treatment Approaches:          PT     Current Medications:     No current outpatient prescriptions on file. Date Last Reviewed:  8/21/2017     EXAMINATION:   Observation/Orthostatic Postural Assessment:    · Poor posture overall  · Flattened lumbar spine  · Posterior pelvic tilt   · Tends to keep B hips flexed with slight tilt forward during standing position  · Tends to lock knees into extension with standing activities.     · Wears heel lift for leg length discrepency     Palpation:    ·       MF tightness noted all directions superficial layers lumbar region   ROM:            Lumbar spine Date:  7/25/17 Date:   Date:     Direction  Parameters Parameters Parameters   Flexion  50%     Extension  nuetral cs     Rotation  R: 25% cs  L: 25%     Side bending  R: 25% cs LE  L: 25%      Hip IR R: mildly limited   L: mildly limited      Hip ER R: mildly limited   L: mildly limited              Strength:            Lower quadrant    DATE  7/25/17 DATE     Hip flexion R: 4  L: 4 R:   L:    Hip Abduction  R: 4  L: 4 R:   L:    Hip Extension  R: 4  L: 4 R:   L:    Knee Flexion  R: 4+  L: 4+ R:   L:    Knee Extension  R: 4+  L: 4+ R:   L:    Ankle Dorsiflexion  R: 4+  L: 4+ R:   L:   Ankle Plantarflexion  R: 4+  L: 4+ R:  L:          Special Tests:          None   Neurological Screen:        Normal   Functional Mobility:         Gait/Ambulation:  Short step length B, decreased hip extension B         Transfers: heavy use B UE         Bed Mobility:  independent slow        Stairs:  Not tested today    Outcome Measure: Tool Used: Modified Oswestry Low Back Pain Questionnaire  Score:  Initial: 25/50  Most Recent: X/50 (Date: -- )   Interpretation of Score: Each section is scored on a 0-5 scale, 5 representing the greatest disability. The scores of each section are added together for a total score of 50. Medical Necessity:   · Patient is expected to demonstrate progress in strength, range of motion and functional technique to increase independence with self care and recreational activities. Reason for Services/Other Comments:  · Patient continues to require modification of therapeutic interventions to increase complexity of exercises. TREATMENT:   (In addition to Assessment/Re-Assessment sessions the following treatments were rendered)  Pre-treatment Symptoms/Complaints: pt states that she is doing pretty well but her right hip catches in middle of night frequently. Going to gym and walking at home for 5 min at a time. Mid back achy and painful today         Pain: Initial:    5/10 low back (left), right hip currently 0/10    Post Session:  0/10 pain. THERAPEUTIC EXERCISE: (30 minutes):  Exercises per grid below to improve mobility, strength, balance and coordination. Required moderate visual, verbal, manual and tactile cues to promote proper body alignment, promote proper body posture, promote proper body mechanics and promote proper body breathing techniques. Progressed resistance, range, repetitions and complexity of movement as indicated.    Date:  7/25/17 Date:  7/27/17 Date:  7/31/17 8/4/17 8/8/17 8/21/17   Activity/Exercise Parameters Parameters Parameters      multif (heel and elbow press) 5 sec x 5  5 x 5 sec    5 sec x 5 reps    iso hip flexor  5 sec x 5 (SL) X 2 B        nustep/treadmill Next visit X 10 min very slow, no resist    X 10 min 1.5  slow  X 10 min level 2.0 good movement   X 11 level 3    bridge Next visit  Next visit  Not today  X 4 between exercises     Marching    X 1 lap slowly  Not today      Side stepping   X 1 lap Not today      LTR  X 6 with assist  X 5 slowly       Diaphragmatic breathing    X 5 min  X 3 min at beginning of exercises      Prone lying   During manual; gave for HEP 5 min 2/day      kegals    X 15 min with different techniques (iso hold and elevator), hold 5-10 sec  3 rounds of elevator exercise; utilized kegal with all pelvic tilt   During backward walking    Pelvic tilt     X 5  With stabilizer x 15 min  In standing    Backward walking       1 lap in hallway focusing on hip extension and lumbar stability. Hip IR/ER in prone      X 10 B   Prone knee bend       X 10    Gluteal set       Prone x 10 5 sec     Manual techniques: 25 min to improve lumbar and hip joint mechanics   Prone with pillow under pelvis  · Deep MFR to left QL and lumbar paraspinals  · IR/ER gentle OP   · Transverse mobs   · Long distraction B LE    Modalities:   Hot pack lumbar prior to manual x 5 min   ice of lumbar region in supine to decrease inflammation x 10 min     Treatment/Session Assessment: tolerated exercises and manual well today. · Response to Treatment:  Released left side QL pain with manual. No pain at end of treatment. Weakness noted of gluteals. · Compliance with Program/Exercises: Will assess as treatment progresses. · Recommendations/Intent for next treatment session: continue manual and promoting elongation of hip flexors,continue to work on TrA with stabilizer.     Future Appointments  Date Time Provider Jennifer Chinchilla   8/23/2017 9:00 AM Antony Soler, PT [facility]       Total Treatment Duration:  PT Patient Time In/Time Out  Time In: 0900  Time Out: [address] [facility], PT

## 2017-08-24 ENCOUNTER — HOSPITAL ENCOUNTER (OUTPATIENT)
Dept: PHYSICAL THERAPY | Age: 57
Discharge: HOME | End: 2017-08-24
Payer: COMMERCIAL

## 2017-08-24 PROCEDURE — 97140 MANUAL THERAPY 1/> REGIONS: CPT

## 2017-08-24 PROCEDURE — 97110 THERAPEUTIC EXERCISES: CPT

## 2017-08-24 NOTE — PROGRESS NOTES
Xiomara Dears  : 1960 [address] at [place] [address] [address] [facility], [facility], [address] [place] [address]  [phone]   [phone]         OUTPATIENT PHYSICAL THERAPY:Daily Note 2017    ICD-10: Treatment Diagnosis: low back pain (M54.5); Radiculopathy, lumbar region (M54.16); difficulty walking (R26.2)  Precautions/Allergies:   Review of patient's allergies indicates not on file. Fall Risk Score: 1 (? 5 = High Risk)  MD Orders: evaluate and treat MEDICAL/REFERRING DIAGNOSIS:  Radiculopathy, lumbar region [M54.16]   DATE OF ONSET: surgery 17  REFERRING PHYSICIAN: Angela Lerma MD  RETURN PHYSICIAN APPOINTMENT: 2017     INITIAL ASSESSMENT:  Ms. Danelle Borja presents with functional limitations due to lumbar pain and stiffness following discectomy 17. PT evaluation reveals weakness of core musculature, poor postural alignment, and lumbar tissue restriction. Pt states that she recently fell onto her low back (post surgery) reporting soreness from this incident. No radicular symptoms or signs and no signs observed that would suggest significant injury to surgical sight. She would highly benefit from skilled PT to address problems below and reach maximum functional mobility. PROBLEM LIST (Impacting functional limitations):  1. Decreased Strength  2. Decreased ADL/Functional Activities  3. Increased Pain  4. Decreased Flexibility/Joint Mobility  5. Decreased [place] with Home Exercise Program INTERVENTIONS PLANNED:  1. Balance Exercise  2. Bed Mobility  3. Cold  4. Gait Training  5. Heat  6. Home Exercise Program (HEP)  7. Manual Therapy  8. Neuromuscular Re-education/Strengthening  9. Range of Motion (ROM)  10. Therapeutic Exercise/Strengthening   TREATMENT PLAN:  Effective Dates: 17 TO 17.   Frequency/Duration: 2 times a week for 4-8 weeks  GOALS: (Goals have been discussed and agreed upon with patient.)  SHORT-TERM FUNCTIONAL GOALS: Time Frame: 2-4 weeks   1. Pt will be independent with HEP focusing on core stability and promoting good spinal alignment. 2. Pt will demonstrate good standing and sitting postural alignment with min to no cuing needed. 3. Pt will report pain level does not exceed 4/10 in a 1-2 week period of time to demonstrate pain management. 4. Pt will be educated on body mechanics during ADLs and IADLs to decrease stress upon low back during in home activities. 5. Pt will no longer use back brace for normal daily activities (may still use for strenuous activities). DISCHARGE GOALS: Time Frame: 6-8 weeks   1. Pt will improve Oswestry score by at least 10 points. 2. Pt will be able to sit to stand 5/5 times with no use of UE.   3. Pt will demonstrate improved AROM of lumbar spine all planes by at least 25% without report of pain to improve functional mobility. 3. Pt will be able to sustain regular exercise routine including walking for recreation 3 + times per week with no limitations due to lumbar pain. 4. Pt will demonstrate at least 4+/5 bilateral hip and TrA strength. Rehabilitation Potential For Stated Goals: Good  Regarding Sultana Velásquez's therapy, I certify that the treatment plan above will be carried out by a therapist or under their direction. Thank you for this referral,  Antony Soler PT     Referring Physician Signature: Aneudy Yi MD              Date                    The information in this section was collected on 7/25/17 (except where otherwise noted). HISTORY:   History of Present Injury/Illness (Reason for Referral):  Pt states 9 months chronic LBP right LE pain, treated with PT and injections prior to surgery on 6/28/17. No LE pain and min to mod back pain post surgery. Pt reports a fall while reaching for something in her closet 4 days ago, landing on back. She states significant low to mid back soreness since fall. She informed surgeon of fall.   No immediate signs of significant injury. Present symptoms (on day of initial evaluation): dull aching of low and mid back    · Aggravating factors: walking short distances, bending forward, sitting prolonged, sit to stand    · Relieving factors: hydrocodone, ice, and heat, wearing soft back brace   · Pain level: 5-6/10 presently, 10/10 worst, 2/10 best     Past Medical History/Comorbidities:   Anxiety, OA, pacemaker  Social History/Living Environment:     lives with spouse one story home   Prior Level of Function/Work/Activity:   (unable to work due to pain of low back pain)  Dominant Side:         RIGHT  Other Clinical Tests:          CT scan   Previous Treatment Approaches:          PT     Current Medications:     No current outpatient prescriptions on file. Date Last Reviewed:  8/24/2017     EXAMINATION:   Observation/Orthostatic Postural Assessment:    · Poor posture overall  · Flattened lumbar spine  · Posterior pelvic tilt   · Tends to keep B hips flexed with slight tilt forward during standing position  · Tends to lock knees into extension with standing activities.     · Wears heel lift for leg length discrepency     Palpation:    ·       MF tightness noted all directions superficial layers lumbar region   ROM:            Lumbar spine Date:  7/25/17 Date:   Date:     Direction  Parameters Parameters Parameters   Flexion  50%     Extension  nuetral cs     Rotation  R: 25% cs  L: 25%     Side bending  R: 25% cs LE  L: 25%      Hip IR R: mildly limited   L: mildly limited      Hip ER R: mildly limited   L: mildly limited              Strength:            Lower quadrant    DATE  7/25/17 DATE     Hip flexion R: 4  L: 4 R:   L:    Hip Abduction  R: 4  L: 4 R:   L:    Hip Extension  R: 4  L: 4 R:   L:    Knee Flexion  R: 4+  L: 4+ R:   L:    Knee Extension  R: 4+  L: 4+ R:   L:    Ankle Dorsiflexion  R: 4+  L: 4+ R:   L:   Ankle Plantarflexion  R: 4+  L: 4+ R:  L:          Special Tests:          None   Neurological Screen:        Normal   Functional Mobility:         Gait/Ambulation:  Short step length B, decreased hip extension B         Transfers: heavy use B UE         Bed Mobility:  independent slow        Stairs:  Not tested today    Outcome Measure: Tool Used: Modified Oswestry Low Back Pain Questionnaire  Score:  Initial: 25/50  Most Recent: X/50 (Date: -- )   Interpretation of Score: Each section is scored on a 0-5 scale, 5 representing the greatest disability. The scores of each section are added together for a total score of 50. Medical Necessity:   · Patient is expected to demonstrate progress in strength, range of motion and functional technique to increase independence with self care and recreational activities. Reason for Services/Other Comments:  · Patient continues to require modification of therapeutic interventions to increase complexity of exercises. TREATMENT:   (In addition to Assessment/Re-Assessment sessions the following treatments were rendered)  Pre-treatment Symptoms/Complaints: pt states that she is doing very well, and got a lot of pain relief from manual techniques last session. Discussed continuing PT 2 x per week for next two weeks and reassessing for progress at that time. Pain: Initial:    2/10 right hip   Post Session:  0/10 pain. THERAPEUTIC EXERCISE: (40 minutes):  Exercises per grid below to improve mobility, strength, balance and coordination. Required moderate visual, verbal, manual and tactile cues to promote proper body alignment, promote proper body posture, promote proper body mechanics and promote proper body breathing techniques. Progressed resistance, range, repetitions and complexity of movement as indicated.    Date:  7/25/17 Date:  7/27/17 Date:  7/31/17 8/4/17 8/8/17 8/21/17 8/24/17   Activity/Exercise Parameters Parameters Parameters       multif (heel and elbow press) 5 sec x 5  5 x 5 sec    5 sec x 5 reps     iso hip flexor  5 sec x 5 (SL) X 2 B         nustep/treadmill Next visit X 10 min very slow, no resist    X 10 min 1.5  slow  X 10 min level 2.0 good movement   X 11 level 3  X 10 level 3    bridge Next visit  Next visit  Not today  X 4 between exercises   X 2    Marching    X 1 lap slowly  Not today    Next visit    Side stepping   X 1 lap Not today    X 2 lap slowly focusing on pelvic/lumbar alignment    LTR  X 6 with assist  X 5 slowly        Diaphragmatic breathing    X 5 min  X 3 min at beginning of exercises    During prone lying    Prone lying   During manual; gave for HEP 5 min 2/day    During manual and glute sets    kegals    X 15 min with different techniques (iso hold and elevator), hold 5-10 sec  3 rounds of elevator exercise; utilized kegal with all pelvic tilt   During backward walking     Pelvic tilt     X 5  With stabilizer x 15 min  In standing  Holding during standing activities   Backward walking       1 lap in hallway focusing on hip extension and lumbar stability. 1 lap    Hip IR/ER in prone      X 10 B Manually    Prone knee bend       X 10     Gluteal set       Prone x 10 5 sec   Prone x 10, 5 sec    Manual techniques: 15 min to improve lumbar and hip joint mechanics   No pillow   · Deep MFR to left QL and lumbar paraspinals  · IR/ER B hips gentle OP   · Transverse mobs   · Long distraction B LE    Modalities:   Hot pack lumbar prior to manual   ice of lumbar region in supine to decrease inflammation x 10 min     Treatment/Session Assessment: improved hip extension with backward walking exercise today. · Response to Treatment:  No pain reported at end of session today. · Compliance with Program/Exercises: Will assess as treatment progresses. · Recommendations/Intent for next treatment session: continue manual and promoting elongation of hip flexors,continue to work on TrA with stabilizer.     Future Appointments  Date Time Provider Jennifer Chinchilla   8/30/2017 9:00 AM Rhonda Esteves PT [facility] AND [facility] 9/7/2017 10:00 AM Jose Plascencia PT [facility]       Total Treatment Duration:  PT Patient Time In/Time Out  Time In: 0900  Time Out: [id number], PT

## 2017-08-30 ENCOUNTER — HOSPITAL ENCOUNTER (OUTPATIENT)
Dept: PHYSICAL THERAPY | Age: 57
Discharge: HOME | End: 2017-08-30
Payer: COMMERCIAL

## 2017-08-30 PROCEDURE — 97140 MANUAL THERAPY 1/> REGIONS: CPT

## 2017-08-30 PROCEDURE — 97110 THERAPEUTIC EXERCISES: CPT

## 2017-08-30 NOTE — PROGRESS NOTES
Carter Getting  : 1960 [address] at [place] [address] [address] [facility], [facility], [address] [place] [address] Road  [phone]   [phone]         OUTPATIENT PHYSICAL THERAPY:Daily Note 2017    ICD-10: Treatment Diagnosis: low back pain (M54.5); Radiculopathy, lumbar region (M54.16); difficulty walking (R26.2)  Precautions/Allergies:   Review of patient's allergies indicates not on file. Fall Risk Score: 1 (? 5 = High Risk)  MD Orders: evaluate and treat MEDICAL/REFERRING DIAGNOSIS:  Radiculopathy, lumbar region [M54.16]   DATE OF ONSET: surgery 17  REFERRING PHYSICIAN: Jimmie Echols MD  RETURN PHYSICIAN APPOINTMENT: 2017     INITIAL ASSESSMENT:  Ms. Vita Cortez presents with functional limitations due to lumbar pain and stiffness following discectomy 17. PT evaluation reveals weakness of core musculature, poor postural alignment, and lumbar tissue restriction. Pt states that she recently fell onto her low back (post surgery) reporting soreness from this incident. No radicular symptoms or signs and no signs observed that would suggest significant injury to surgical sight. She would highly benefit from skilled PT to address problems below and reach maximum functional mobility. PROBLEM LIST (Impacting functional limitations):  1. Decreased Strength  2. Decreased ADL/Functional Activities  3. Increased Pain  4. Decreased Flexibility/Joint Mobility  5. Decreased [place] with Home Exercise Program INTERVENTIONS PLANNED:  1. Balance Exercise  2. Bed Mobility  3. Cold  4. Gait Training  5. Heat  6. Home Exercise Program (HEP)  7. Manual Therapy  8. Neuromuscular Re-education/Strengthening  9. Range of Motion (ROM)  10. Therapeutic Exercise/Strengthening   TREATMENT PLAN:  Effective Dates: 17 TO 17.   Frequency/Duration: 2 times a week for 4-8 weeks  GOALS: (Goals have been discussed and agreed upon with patient.)  SHORT-TERM FUNCTIONAL GOALS: Time Frame: 2-4 weeks   1. Pt will be independent with HEP focusing on core stability and promoting good spinal alignment. 2. Pt will demonstrate good standing and sitting postural alignment with min to no cuing needed. 3. Pt will report pain level does not exceed 4/10 in a 1-2 week period of time to demonstrate pain management. 4. Pt will be educated on body mechanics during ADLs and IADLs to decrease stress upon low back during in home activities. 5. Pt will no longer use back brace for normal daily activities (may still use for strenuous activities). DISCHARGE GOALS: Time Frame: 6-8 weeks   1. Pt will improve Oswestry score by at least 10 points. 2. Pt will be able to sit to stand 5/5 times with no use of UE.   3. Pt will demonstrate improved AROM of lumbar spine all planes by at least 25% without report of pain to improve functional mobility. 3. Pt will be able to sustain regular exercise routine including walking for recreation 3 + times per week with no limitations due to lumbar pain. 4. Pt will demonstrate at least 4+/5 bilateral hip and TrA strength. Rehabilitation Potential For Stated Goals: Good  Regarding Sultana Velásquez's therapy, I certify that the treatment plan above will be carried out by a therapist or under their direction. Thank you for this referral,  Rhonda Esteves PT     Referring Physician Signature: Cydney Luna MD              Date                    The information in this section was collected on 7/25/17 (except where otherwise noted). HISTORY:   History of Present Injury/Illness (Reason for Referral):  Pt states 9 months chronic LBP right LE pain, treated with PT and injections prior to surgery on 6/28/17. No LE pain and min to mod back pain post surgery. Pt reports a fall while reaching for something in her closet 4 days ago, landing on back. She states significant low to mid back soreness since fall. She informed surgeon of fall.   No immediate signs of significant injury. Present symptoms (on day of initial evaluation): dull aching of low and mid back    · Aggravating factors: walking short distances, bending forward, sitting prolonged, sit to stand    · Relieving factors: hydrocodone, ice, and heat, wearing soft back brace   · Pain level: 5-6/10 presently, 10/10 worst, 2/10 best     Past Medical History/Comorbidities:   Anxiety, OA, pacemaker  Social History/Living Environment:     lives with spouse one story home   Prior Level of Function/Work/Activity:   (unable to work due to pain of low back pain)  Dominant Side:         RIGHT  Other Clinical Tests:          CT scan   Previous Treatment Approaches:          PT     Current Medications:     No current outpatient prescriptions on file. Date Last Reviewed:  8/30/2017     EXAMINATION:   Observation/Orthostatic Postural Assessment:    · Poor posture overall  · Flattened lumbar spine  · Posterior pelvic tilt   · Tends to keep B hips flexed with slight tilt forward during standing position  · Tends to lock knees into extension with standing activities.     · Wears heel lift for leg length discrepency     Palpation:    ·       MF tightness noted all directions superficial layers lumbar region   ROM:            Lumbar spine Date:  7/25/17 Date:   Date:     Direction  Parameters Parameters Parameters   Flexion  50%     Extension  nuetral cs     Rotation  R: 25% cs  L: 25%     Side bending  R: 25% cs LE  L: 25%      Hip IR R: mildly limited   L: mildly limited      Hip ER R: mildly limited   L: mildly limited              Strength:            Lower quadrant    DATE  7/25/17 DATE     Hip flexion R: 4  L: 4 R:   L:    Hip Abduction  R: 4  L: 4 R:   L:    Hip Extension  R: 4  L: 4 R:   L:    Knee Flexion  R: 4+  L: 4+ R:   L:    Knee Extension  R: 4+  L: 4+ R:   L:    Ankle Dorsiflexion  R: 4+  L: 4+ R:   L:   Ankle Plantarflexion  R: 4+  L: 4+ R:  L:          Special Tests:          None   Neurological Screen:        Normal   Functional Mobility:         Gait/Ambulation:  Short step length B, decreased hip extension B         Transfers: heavy use B UE         Bed Mobility:  independent slow        Stairs:  Not tested today    Outcome Measure: Tool Used: Modified Oswestry Low Back Pain Questionnaire  Score:  Initial: 25/50  Most Recent: X/50 (Date: -- )   Interpretation of Score: Each section is scored on a 0-5 scale, 5 representing the greatest disability. The scores of each section are added together for a total score of 50. Medical Necessity:   · Patient is expected to demonstrate progress in strength, range of motion and functional technique to increase independence with self care and recreational activities. Reason for Services/Other Comments:  · Patient continues to require modification of therapeutic interventions to increase complexity of exercises. TREATMENT:   (In addition to Assessment/Re-Assessment sessions the following treatments were rendered)  Pre-treatment Symptoms/Complaints: pt states she has been in a lot of pain of her neck and head and is not sure what started that. She has spoken to MD and is scheduled for an x-ray. Doing well with low back, only using brace minimally at this time. She states that she is walking around block. Pain: Initial:    2/10 right hip, 6/10 cervical and UE symptoms    Post Session:  0/10 pain. THERAPEUTIC EXERCISE: (15 minutes):  Exercises per grid below to improve mobility, strength, balance and coordination. Required moderate visual, verbal, manual and tactile cues to promote proper body alignment, promote proper body posture, promote proper body mechanics and promote proper body breathing techniques. Progressed resistance, range, repetitions and complexity of movement as indicated.    Date:  7/25/17 Date:  7/27/17 Date:  7/31/17 8/4/17 8/8/17 8/21/17 8/24/17 8/30/17   Activity/Exercise Parameters Parameters Parameters multif (heel and elbow press) 5 sec x 5  5 x 5 sec    5 sec x 5 reps      iso hip flexor  5 sec x 5 (SL) X 2 B          nustep/treadmill Next visit X 10 min very slow, no resist    X 10 min 1.5  slow  X 10 min level 2.0 good movement   X 11 level 3  X 10 level 3     bridge Next visit  Next visit  Not today  X 4 between exercises   X 2     Marching    X 1 lap slowly  Not today    Next visit  X 35 ft   Side stepping   X 1 lap Not today    X 2 lap slowly focusing on pelvic/lumbar alignment  X 2 laps    LTR  X 6 with assist  X 5 slowly         Diaphragmatic breathing    X 5 min  X 3 min at beginning of exercises    During prone lying     Prone lying   During manual; gave for HEP 5 min 2/day    During manual and glute sets     kegals    X 15 min with different techniques (iso hold and elevator), hold 5-10 sec  3 rounds of elevator exercise; utilized kegal with all pelvic tilt   During backward walking   During standing exercises     Pelvic tilt     X 5  With stabilizer x 15 min  In standing  Holding during standing activities    Backward walking       1 lap in hallway focusing on hip extension and lumbar stability. 1 lap     Hip IR/ER in prone      X 10 B Manually     Prone knee bend       X 10      Gluteal set       Prone x 10 5 sec   Prone x 10, 5 sec     gait        X 400 ft focused on lumbar and pelvic stability during stance phase B. Cuing for kegal during activity     Manual techniques: 25 min to improve spinal and hip joint mechanics   No pillow   · Deep MFR to left QL and lumbar paraspinals  · Grade II CPA's to mid-upper lumbar and thoracic vert, also cervical addressed each level briefly   · Transverse mobs to lower cervical    · superficial MFR to upper and mid thoracic     Modalities:    ice application to cervical region in supine to decrease inflammation x 10 min     Treatment/Session Assessment: pt with improved endurance and lumbar postioning during standing activities.  increased mobility of soft tissue and vertebral joints with manual techniques. No significant pain upon departure. · Response to Treatment:  No pain reported at end of session today. · Compliance with Program/Exercises: Will assess as treatment progresses. · Recommendations/Intent for next treatment session: continue manual and promoting elongation of hip flexors,continue to work on TrA with stabilizer.     Future Appointments  Date Time Provider Jennifer Chinchilla   9/7/2017 10:00 AM Whitney Ramirez, PT [facility]       Total Treatment Duration:  PT Patient Time In/Time Out  Time In: 0900  Time Out: [address], PT

## 2017-09-07 ENCOUNTER — APPOINTMENT (OUTPATIENT)
Dept: PHYSICAL THERAPY | Age: 57
End: 2017-09-07
Payer: SELF-PAY

## 2017-09-13 ENCOUNTER — HOSPITAL ENCOUNTER (OUTPATIENT)
Dept: PHYSICAL THERAPY | Age: 57
Discharge: HOME | End: 2017-09-13
Payer: SELF-PAY

## 2017-09-13 PROCEDURE — 97110 THERAPEUTIC EXERCISES: CPT

## 2017-09-13 NOTE — PROGRESS NOTES
Arleth Sargent  : 1960 [address] [facility] [place] [address] [address] [place] [address] [address] [place] [address], [facility], [address] [place] [address]  [phone]   [phone]         OUTPATIENT PHYSICAL THERAPY:Daily Note and Discharge 2017    ICD-10: Treatment Diagnosis: low back pain (M54.5); Radiculopathy, lumbar region (M54.16); difficulty walking (R26.2)  Precautions/Allergies:   Review of patient's allergies indicates not on file. Fall Risk Score: 1 (? 5 = High Risk)  MD Orders: evaluate and treat MEDICAL/REFERRING DIAGNOSIS:  Radiculopathy, lumbar region [M54.16]   DATE OF ONSET: surgery 17  REFERRING PHYSICIAN: Korin Goss MD  RETURN PHYSICIAN APPOINTMENT: 2017     INITIAL ASSESSMENT:  Ms. Shannan Cruz has attended 9 scheduled PT visits with 2 cancellations and 1 no show, for treatment s/p lumbar surgery. She has met all therapy goals at this time and voices and demonstrates independence with her current HEP. Lengthy discussion about self progression of HEP to promote increased core stability with higher level activities. Pt is discharged from out pt PT to continue with HEP on own. TREATMENT PLAN:  GOALS: (Goals have been discussed and agreed upon with patient.)  SHORT-TERM FUNCTIONAL GOALS:    1. Pt will be independent with HEP focusing on core stability and promoting good spinal alignment. (Met)  2. Pt will demonstrate good standing and sitting postural alignment with min to no cuing needed. (met)  3. Pt will report pain level does not exceed 4/10 in a 1-2 week period of time to demonstrate pain management. (met)  4. Pt will be educated on body mechanics during ADLs and IADLs to decrease stress upon low back during in home activities.(met)   5. Pt will no longer use back brace for normal daily activities (may still use for strenuous activities). (met)  DISCHARGE GOALS:   1. Pt will improve Oswestry score by at least 10 points. (met)  2.  Pt will be able to sit to stand 5/5 times with no use of UE. (met)  3. Pt will demonstrate improved AROM of lumbar spine all planes by at least 25% without report of pain to improve functional mobility. (met)  3. Pt will be able to sustain regular exercise routine including walking for recreation 3 + times per week with no limitations due to lumbar pain.(met)  4. Pt will demonstrate at least 4+/5 bilateral hip and TrA strength. (met)        Thank you for this referral,  Catarina Talamantes, PT                 The information in this section was collected on 7/25/17 (except where otherwise noted). HISTORY:   History of Present Injury/Illness (Reason for Referral):  Pt states 9 months chronic LBP right LE pain, treated with PT and injections prior to surgery on 6/28/17. No LE pain and min to mod back pain post surgery. Pt reports a fall while reaching for something in her closet 4 days ago, landing on back. She states significant low to mid back soreness since fall. She informed surgeon of fall. No immediate signs of significant injury. Present symptoms (on day of initial evaluation): dull aching of low and mid back    · Aggravating factors: walking short distances, bending forward, sitting prolonged, sit to stand    · Relieving factors: hydrocodone, ice, and heat, wearing soft back brace   · Pain level: 5-6/10 presently, 10/10 worst, 2/10 best     Past Medical History/Comorbidities:   Anxiety, OA, pacemaker  Social History/Living Environment:     lives with spouse one story home   Prior Level of Function/Work/Activity:   (unable to work due to pain of low back pain)  Dominant Side:         RIGHT  Other Clinical Tests:          CT scan   Previous Treatment Approaches:          PT     Current Medications:     No current outpatient prescriptions on file.    Date Last Reviewed:  9/13/2017     EXAMINATION: 9/13/17   Observation/Orthostatic Postural Assessment:    · demonstrates good alignment and overall posture with min to no suing needed. · Flattened lumbar spine       Palpation:    ·      Mild MF tightness noted all directions superficial layers lumbar region   ROM:            Lumbar spine Date:  7/25/17 Date:  9/13/17 Date:     Direction  Parameters Parameters Parameters   Flexion  50% 100%    Extension  nuetral cs 50%    Rotation  R: 25% cs  L: 25% B 75%    Side bending  R: 25% cs LE  L: 25%  75%    Hip IR R: mildly limited   L: mildly limited  WFL     Hip ER R: mildly limited   L: mildly limited  WFL             Strength:            Lower quadrant    DATE  7/25/17 DATE  9/13/17   Hip flexion R: 4  L: 4 R: 4+  L: 4+   Hip Abduction  R: 4  L: 4 R: 4+  L: 4+   Hip Extension  R: 4  L: 4 R: 4+  L: 4+   Knee Flexion  R: 4+  L: 4+ R: 5  L: 5   Knee Extension  R: 4+  L: 4+ R: 5  L: 5   Ankle Dorsiflexion  R: 4+  L: 4+ R: 5  L: 5   Ankle Plantarflexion  R: 4+  L: 4+ R: 5   L: 5          Special Tests:          None   Neurological Screen:        Normal   Functional Mobility:         Gait/Ambulation: even step length, decreased trunk movement, no LOB or c/o of pain         Transfers: 5/5 sit to stand without use of UE, good control          Bed Mobility:  independent slow        Stairs:  Not tested today    Outcome Measure: Tool Used: Modified Oswestry Low Back Pain Questionnaire  Score:  Initial: 25/50  Most Recent: 14/50 (Date: 9/13/17 )   Interpretation of Score: Each section is scored on a 0-5 scale, 5 representing the greatest disability. The scores of each section are added together for a total score of 50. TREATMENT:   (In addition to Assessment/Re-Assessment sessions the following treatments were rendered)  Pre-treatment Symptoms/Complaints: pt reports overall doing progressively better with low back symptoms. She also states that she is wearing the lumbar brace very rarely with only occasional sharp pains.  She is trying to increase her endurance of core muscles to be able to walk longer distances during her small vacation in a few weeks. Pt voices independence with HEP and  readiness for discharge if she has met all current goals. Cervical symptoms decreased overall. Pain: Initial:    2/10 right hip (to receive injection for bursitis soon)    Post Session:  2/10 right hip       THERAPEUTIC EXERCISE: (30 minutes):  Exercises per grid below to improve mobility, strength, balance and coordination. Required moderate visual, verbal, manual and tactile cues to promote proper body alignment, promote proper body posture, promote proper body mechanics and promote proper body breathing techniques. Progressed resistance, range, repetitions and complexity of movement as indicated. Date:  7/27/17 Date:  7/31/17 8/4/17 8/8/17 8/21/17 8/24/17 8/30/17 9/13/17   Activity/Exercise Parameters Parameters         multif (heel and elbow press) 5 x 5 sec    5 sec x 5 reps       iso hip flexor  X 2 B           nustep/treadmill X 10 min very slow, no resist    X 10 min 1.5  slow  X 10 min level 2.0 good movement   X 11 level 3  X 10 level 3   X 10 min level 4    bridge Next visit  Not today  X 4 between exercises   X 2      Marching  X 1 lap slowly  Not today    Next visit  X 35 ft    Side stepping  X 1 lap Not today    X 2 lap slowly focusing on pelvic/lumbar alignment  X 2 laps     LTR X 6 with assist  X 5 slowly          Diaphragmatic breathing   X 5 min  X 3 min at beginning of exercises    During prone lying      Prone lying  During manual; gave for HEP 5 min 2/day    During manual and glute sets      kegals   X 15 min with different techniques (iso hold and elevator), hold 5-10 sec  3 rounds of elevator exercise; utilized kegal with all pelvic tilt   During backward walking   During standing exercises   during walking activities    Pelvic tilt    X 5  With stabilizer x 15 min  In standing  Holding during standing activities     Backward walking      1 lap in hallway focusing on hip extension and lumbar stability.   1 lap      Hip IR/ER in prone     X 10 B Manually      Prone knee bend      X 10       Gluteal set      Prone x 10 5 sec   Prone x 10, 5 sec      gait       X 400 ft focused on lumbar and pelvic stability during stance phase B. Cuing for kegal during activity   X 400 ft with engagement of lumbar and pelvic stabilizers    AROM spine all plane with good segmental movement         X 5 min    B LE strength testing with TrA control         No cuing needed x 5 min         Treatment/Session Assessment: pt with improved endurance and lumbar postioning during standing activities. Appeared to have good understating of self progression with HEP post discharge. · Response to Treatment:  No change of pain reported at end of session today. · Compliance with Program/Exercises: good. No future appointments.     Total Treatment Duration:    PT Patient Time In/Time Out  Time In: 1000  Time Out: Ang[place] 061

## 2017-12-29 ENCOUNTER — HOSPITAL ENCOUNTER (EMERGENCY)
Age: 57
Discharge: HOME | End: 2017-12-29
Attending: STUDENT IN AN ORGANIZED HEALTH CARE EDUCATION/TRAINING PROGRAM
Payer: COMMERCIAL

## 2017-12-29 ENCOUNTER — APPOINTMENT (OUTPATIENT)
Dept: GENERAL RADIOLOGY | Age: 57
End: 2017-12-29
Attending: NURSE PRACTITIONER
Payer: COMMERCIAL

## 2017-12-29 ENCOUNTER — APPOINTMENT (OUTPATIENT)
Dept: CT IMAGING | Age: 57
End: 2017-12-29
Attending: NURSE PRACTITIONER
Payer: COMMERCIAL

## 2017-12-29 VITALS
DIASTOLIC BLOOD PRESSURE: 69 MMHG | BODY MASS INDEX: 26.66 KG/M2 | OXYGEN SATURATION: 99 % | TEMPERATURE: 97.4 F | SYSTOLIC BLOOD PRESSURE: 136 MMHG | RESPIRATION RATE: 18 BRPM | WEIGHT: 160 LBS | HEIGHT: 65 IN | HEART RATE: 58 BPM
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
## 2017-12-29 DIAGNOSIS — S16.1XXA STRAIN OF NECK MUSCLE, INITIAL ENCOUNTER: ICD-10-CM

## 2017-12-29 DIAGNOSIS — M25.512 ACUTE PAIN OF LEFT SHOULDER: ICD-10-CM

## 2017-12-29 PROCEDURE — 96372 THER/PROPH/DIAG INJ SC/IM: CPT | Performed by: NURSE PRACTITIONER

## 2017-12-29 PROCEDURE — 74011250637 HC RX REV CODE- 250/637: Performed by: NURSE PRACTITIONER

## 2017-12-29 PROCEDURE — 99283 EMERGENCY DEPT VISIT LOW MDM: CPT | Performed by: NURSE PRACTITIONER

## 2017-12-29 PROCEDURE — 73030 X-RAY EXAM OF SHOULDER: CPT

## 2017-12-29 PROCEDURE — 72125 CT NECK SPINE W/O DYE: CPT

## 2017-12-29 PROCEDURE — 74011250636 HC RX REV CODE- 250/636: Performed by: NURSE PRACTITIONER

## 2017-12-29 RX ORDER — DEXAMETHASONE SODIUM PHOSPHATE 100 MG/10ML
10 INJECTION INTRAMUSCULAR; INTRAVENOUS
Status: COMPLETED | OUTPATIENT
Start: 2017-12-29 | End: 2017-12-29

## 2017-12-29 RX ORDER — HYDROCODONE BITARTRATE AND ACETAMINOPHEN 5; 325 MG/1; MG/1
1 TABLET ORAL ONCE
Status: COMPLETED | OUTPATIENT
Start: 2017-12-29 | End: 2017-12-29

## 2017-12-29 RX ORDER — DIAZEPAM 2 MG/1
TABLET ORAL
Qty: 20 TAB | Refills: 0 | Status: SHIPPED | OUTPATIENT
Start: 2017-12-29

## 2017-12-29 RX ORDER — KETOROLAC TROMETHAMINE 30 MG/ML
30 INJECTION, SOLUTION INTRAMUSCULAR; INTRAVENOUS
Status: COMPLETED | OUTPATIENT
Start: 2017-12-29 | End: 2017-12-29

## 2017-12-29 RX ORDER — PREDNISONE 20 MG/1
TABLET ORAL
Qty: 3 TAB | Refills: 0 | Status: SHIPPED | OUTPATIENT
Start: 2017-12-29 | End: 2020-11-28

## 2017-12-29 RX ORDER — LIDOCAINE 50 MG/G
1 PATCH TOPICAL EVERY 24 HOURS
Status: DISCONTINUED | OUTPATIENT
Start: 2017-12-29 | End: 2017-12-29 | Stop reason: HOSPADM

## 2017-12-29 RX ADMIN — DEXAMETHASONE SODIUM PHOSPHATE 10 MG: 10 INJECTION INTRAMUSCULAR; INTRAVENOUS at 12:08

## 2017-12-29 RX ADMIN — HYDROCODONE BITARTRATE AND ACETAMINOPHEN 1 TABLET: 5; 325 TABLET ORAL at 12:08

## 2017-12-29 RX ADMIN — KETOROLAC TROMETHAMINE 30 MG: 30 INJECTION, SOLUTION INTRAMUSCULAR at 12:08

## 2017-12-29 NOTE — DISCHARGE INSTRUCTIONS
Neck Strain or Sprain: Rehab Exercises  Your Care Instructions  Here are some examples of typical rehabilitation exercises for your condition. Start each exercise slowly. Ease off the exercise if you start to have pain. Your doctor or physical therapist will tell you when you can start these exercises and which ones will work best for you. How to do the exercises  Neck rotation    1. Sit in a firm chair, or stand up straight. 2. Keeping your chin level, turn your head to the right, and hold for 15 to 30 seconds. 3. Turn your head to the left and hold for 15 to 30 seconds. 4. Repeat 2 to 4 times to each side. Neck stretches    1. Look straight ahead, and tip your right ear to your right shoulder. Do not let your left shoulder rise up as you tip your head to the right. 2. Hold for 15 to 30 seconds. 3. Tilt your head to the left. Do not let your right shoulder rise up as you tip your head to the left. 4. Hold for 15 to 30 seconds. 5. Repeat 2 to 4 times to each side. Forward neck flexion    1. Sit in a firm chair, or stand up straight. 2. Bend your head forward. 3. Hold for 15 to 30 seconds. 4. Repeat 2 to 4 times. Lateral (side) bend strengthening    1. With your right hand, place your first two fingers on your right temple. 2. Start to bend your head to the side while using gentle pressure from your fingers to keep your head from bending. 3. Hold for about 6 seconds. 4. Repeat 8 to 12 times. 5. Switch hands and repeat the same exercise on your left side. Forward bend strengthening    1. Place your first two fingers of either hand on your forehead. 2. Start to bend your head forward while using gentle pressure from your fingers to keep your head from bending. 3. Hold for about 6 seconds. 4. Repeat 8 to 12 times. Neutral position strengthening    1. Using one hand, place your fingertips on the back of your head at the top of your neck.   2. Start to bend your head backward while using gentle pressure from your fingers to keep your head from bending. 3. Hold for about 6 seconds. 4. Repeat 8 to 12 times. Chin tuck    1. Lie on the floor with a rolled-up towel under your neck. Your head should be touching the floor. 2. Slowly bring your chin toward your chest.  3. Hold for a count of 6, and then relax for up to 10 seconds. 4. Repeat 8 to 12 times. Follow-up care is a key part of your treatment and safety. Be sure to make and go to all appointments, and call your doctor if you are having problems. It's also a good idea to know your test results and keep a list of the medicines you take. Where can you learn more? Go to http://soraya-[place].info/. Enter M679 in the search box to learn more about \"Neck Strain or Sprain: Rehab Exercises. \"  Current as of: March 21, 2017  Content Version: 11.4  © 4631-9518 [employer]. Care instructions adapted under license by [employer] (which disclaims liability or warranty for this information). If you have questions about a medical condition or this instruction, always ask your healthcare professional. [address] any warranty or liability for your use of this information. Neck Strain: Care Instructions  Your Care Instructions    You have strained the muscles and ligaments in your neck. A sudden, awkward movement can strain the neck. This often occurs with falls or car accidents or during certain sports. Everyday activities like working on a computer or sleeping can also cause neck strain if they force you to hold your neck in an awkward position for a long time. It is common for neck pain to get worse for a day or two after an injury, but it should start to feel better after that. You may have more pain and stiffness for several days before it gets better. This is expected. It may take a few weeks or longer for it to heal completely.  Good home treatment can help you get better faster and avoid future neck problems. Follow-up care is a key part of your treatment and safety. Be sure to make and go to all appointments, and call your doctor if you are having problems. It's also a good idea to know your test results and keep a list of the medicines you take. How can you care for yourself at home? · If you were given a neck brace (cervical collar) to limit neck motion, wear it as instructed for as many days as your doctor tells you to. Do not wear it longer than you were told to. Wearing a brace for too long can make neck stiffness worse and weaken the neck muscles. · You can try using heat or ice to see if it helps. ¨ Try using a heating pad on a low or medium setting for 15 to 20 minutes every 2 to 3 hours. Try a warm shower in place of one session with the heating pad. You can also buy single-use heat wraps that last up to 8 hours. ¨ You can also try an ice pack for 10 to 15 minutes every 2 to 3 hours. · Take pain medicines exactly as directed. ¨ If the doctor gave you a prescription medicine for pain, take it as prescribed. ¨ If you are not taking a prescription pain medicine, ask your doctor if you can take an over-the-counter medicine. · Gently rub the area to relieve pain and help with blood flow. Do not massage the area if it hurts to do so. · Do not do anything that makes the pain worse. Take it easy for a couple of days. You can do your usual activities if they do not hurt your neck or put it at risk for more stress or injury. · Try sleeping on a special neck pillow. Place it under your neck, not under your head. Placing a tightly rolled-up towel under your neck while you sleep will also work. If you use a neck pillow or rolled towel, do not use your regular pillow at the same time. · To prevent future neck pain, do exercises to stretch and strengthen your neck and back. Learn how to use good posture, safe lifting techniques, and proper body mechanics.   When should you call for help?  Call 911 anytime you think you may need emergency care. For example, call if:  ? · You are unable to move an arm or a leg at all. ?Call your doctor now or seek immediate medical care if:  ? · You have new or worse symptoms in your arms, legs, chest, belly, or buttocks. Symptoms may include:  ¨ Numbness or tingling. ¨ Weakness. ¨ Pain. ? · You lose bladder or bowel control. ? Watch closely for changes in your health, and be sure to contact your doctor if:  ? · You are not getting better as expected. Where can you learn more? Go to http://soraya-[place].info/. Enter M253 in the search box to learn more about \"Neck Strain: Care Instructions. \"  Current as of: March 21, 2017  Content Version: 11.4  © 4832-1287 [employer]. Care instructions adapted under license by [employer] (which disclaims liability or warranty for this information). If you have questions about a medical condition or this instruction, always ask your healthcare professional. [place] [address] any warranty or liability for your use of this information. Learning About RICE (Rest, Ice, Compression, and Elevation)  What is RICE? RICE is a way to care for an injury. RICE helps relieve pain and swelling. It may also help with healing and flexibility. RICE stands for:  · Rest and protect the injured or sore area. · Ice or a cold pack used as soon as possible. · Compression, or wrapping the injured or sore area with an elastic bandage. · Elevation (propping up) the injured or sore area. How do you do RICE? You can use RICE for home treatment when you have general aches and pains or after an injury or surgery. Rest  · Do not put weight on the injury for at least 24 to 48 hours. · Use crutches for a badly sprained knee or ankle. · Support a sprained wrist, elbow, or shoulder with a sling.   Ice  · Put ice or a cold pack on the injury right away to reduce pain and swelling. Frozen vegetables will also work as an ice pack. Put a thin cloth between the ice or cold pack and your skin. The cloth protects the injured area from getting too cold. · Use ice for 10 to 15 minutes at a time for the first 48 to 72 hours. Compression  · Use compression for sprains, strains, and surgeries of the arms and legs. · Wrap the injured area with an elastic bandage or compression sleeve to reduce swelling. · Don't wrap it too tightly. If the area below it feels numb, tingles, or feels cool, loosen the wrap. Elevation  · Use elevation for areas of the body that can be propped up, such as arms and legs. · Prop up the injured area on pillows whenever you use ice. Keep it propped up anytime you sit or lie down. · Try to keep the injured area at or above the level of your heart. This will help reduce swelling and bruising. Where can you learn more? Go to http://soraya-[place].info/. Enter R744 in the search box to learn more about \"Learning About RICE (Rest, Ice, Compression, and Elevation). \"  Current as of: March 21, 2017  Content Version: 11.4  © 6451-9762 [employer]. Care instructions adapted under license by [employer] (which disclaims liability or warranty for this information). If you have questions about a medical condition or this instruction, always ask your healthcare professional. [place] [address] any warranty or liability for your use of this information.

## 2017-12-29 NOTE — ED TRIAGE NOTES
Pt c/o left sided neck pain x 2 days s/p falling off bar stool. Pt states she had no LOC when falling. Pt denies any numbness or tingling to extremities at this time. Pt denies any chest pain or shortness of breath. Pt is alert and oriented x 4. Respirations are even and unlabored. Pt is ambulatory to triage without any difficulty.

## 2017-12-29 NOTE — ED NOTES
I have reviewed discharge instructions with the patient. The patient verbalized understanding. Patient left ED via Discharge Method: ambulatory to Home with spouse. Opportunity for questions and clarification provided. Patient given 2 scripts. To continue your aftercare when you leave the hospital, you may receive an automated call from our care team to check in on how you are doing. This is a free service and part of our promise to provide the best care and service to meet your aftercare needs.  If you have questions, or wish to unsubscribe from this service please call [phone]. Thank you for Choosing our [facility] Emergency Department.

## 2017-12-29 NOTE — ED PROVIDER NOTES
HPI Comments: 61 y/o f to ed with complaint of left sided neck pain last two days. Reports she fell from a broken bar stool. She landed on her left shoulder and the jarring she suspects caused her neck to hurt. She has hsx of disc problems in her nect - particularly C6. She didn't strike her head and had no loc but has a headache today. She has pain left and central posterior neck. At times radiates into hand with numbness in hand. She also has pain left shoulder. She maintains excellent rom, with equal  bilat. She is ambulatory, drove herself here. She appears in moderate pain. hsx pacemaker, had ct myelogram a few weeks ago of her neck at another facility. Patient is a 62 y.o. female presenting with neck pain. The history is provided by the patient. No  was used. Neck Pain    This is a new problem. The current episode started 2 days ago. The problem has not changed since onset. The pain is associated with a fall. There has been no fever. The pain is present in the left side. The pain radiates to the left hand. The pain is moderate. Associated symptoms include numbness. Pertinent negatives include no photophobia, no visual change, no chest pain, no syncope, no weight loss, no headaches, no bowel incontinence, no bladder incontinence, no leg pain, no paresis, no tingling and no weakness. No past medical history on file. No past surgical history on file. No family history on file. Social History     Social History    Marital status:      Spouse name: N/A    Number of children: N/A    Years of education: N/A     Occupational History    Not on file.      Social History Main Topics    Smoking status: Not on file    Smokeless tobacco: Not on file    Alcohol use Not on file    Drug use: Not on file    Sexual activity: Not on file     Other Topics Concern    Not on file     Social History Narrative         ALLERGIES: Review of patient's allergies indicates not on file. Review of Systems   Constitutional: Negative for chills, fever and weight loss. HENT: Negative for facial swelling and mouth sores. Eyes: Negative for photophobia and discharge. Respiratory: Negative for cough and shortness of breath. Cardiovascular: Negative for chest pain, palpitations and syncope. Gastrointestinal: Negative for bowel incontinence, nausea and vomiting. Endocrine: Negative for cold intolerance and heat intolerance. Genitourinary: Negative for bladder incontinence, difficulty urinating, dysuria and pelvic pain. Musculoskeletal: Positive for neck pain and neck stiffness. Negative for back pain. Skin: Negative for pallor and wound. Neurological: Positive for numbness. Negative for dizziness, tingling, tremors, syncope, facial asymmetry, speech difficulty, weakness, light-headedness and headaches. Psychiatric/Behavioral: Negative for confusion and decreased concentration. The patient is nervous/anxious. Vitals:    12/29/17 0939   BP: 145/86   Pulse: 64   Resp: 18   Temp: 97.4 °F (36.3 °C)   SpO2: 100%   Weight: 72.6 kg (160 lb)   Height: 5' 5\" (1.651 m)            Physical Exam   Constitutional: She is oriented to person, place, and time. She appears well-developed and well-nourished. No distress. HENT:   Head: Normocephalic and atraumatic. Right Ear: External ear normal.   Left Ear: External ear normal.   Nose: Nose normal.   Eyes: Conjunctivae and EOM are normal. Pupils are equal, round, and reactive to light. Neck: Neck supple. Spinous process tenderness and muscular tenderness present. Decreased range of motion present. Cardiovascular: Normal rate, regular rhythm and normal heart sounds. Pulmonary/Chest: Effort normal and breath sounds normal. No respiratory distress. She has no wheezes. Abdominal: Soft. Bowel sounds are normal. She exhibits no distension. There is no tenderness.    Musculoskeletal: She exhibits no edema or tenderness. Arms:  Neurological: She is alert and oriented to person, place, and time. No cranial nerve deficit. Coordination normal.   Skin: Skin is warm and dry. No rash noted. Psychiatric: She has a normal mood and affect. Her behavior is normal. Judgment and thought content normal.   Nursing note and vitals reviewed. MDM  Number of Diagnoses or Management Options  Diagnosis management comments: 61 y/o f to ed with complaint of left sided neck pain last two days. Reports she fell from a broken bar stool. She landed on her left shoulder and the jarring she suspects caused her neck to hurt. She has hsx of disc problems in her nect - particularly C6. She didn't strike her head and had no loc but has a headache today. She has pain left and central posterior neck. At times radiates into hand with numbness in hand. She also has pain left shoulder. She maintains excellent rom, with equal  bilat. She is ambulatory, drove herself here. She appears in moderate pain. hsx pacemaker, had ct myelogram a few weeks ago of her neck at another facility. Triage does not list allergies but her family md visit on 12/20 of this year lists allergies as: cipro causing nasuea and vomiting, tramadol causing headaches, and nsaids causing bruising. Her home meds listed at that visit include:  Xanax, wellbutrin, soma, vit D3, CoQ 10, norco 10, remerion, prilosec, pravachol, bactrim, topamax, ambien, hycodan, and levsin. Will ask permission to load her ct onto our system, as well obtain ct c spine, xray shoulder, and provide pain med which she can drive with in ed. 11:56 AM  Results reviewed w dr Shiraz Godinez. No new findings. Will dc to follow with her md next week. Pt tearful,  on the way. Will provide pain med in ed now.          Amount and/or Complexity of Data Reviewed  Tests in the radiology section of CPT®: ordered and reviewed  Discuss the patient with other providers: yes (pro)    Risk of Complications, Morbidity, and/or Mortality  Presenting problems: minimal  Diagnostic procedures: minimal  Management options: low    Patient Progress  Patient progress: stable    ED Course       Procedures

## 2018-11-07 ENCOUNTER — HOSPITAL ENCOUNTER (OUTPATIENT)
Dept: LAB | Age: 58
Discharge: HOME | End: 2018-11-07
Payer: COMMERCIAL

## 2018-11-07 LAB
CRP SERPL-MCNC: <0.3 MG/DL (ref 0–0.9)
ERYTHROCYTE [SEDIMENTATION RATE] IN BLOOD: 6 MM/HR (ref 0–30)
FIBRINOGEN PPP-MCNC: 289 MG/DL (ref 190–501)
URATE SERPL-MCNC: 2.3 MG/DL (ref 2.6–6)

## 2018-11-07 PROCEDURE — 85810 BLOOD VISCOSITY EXAMINATION: CPT

## 2018-11-07 PROCEDURE — 85652 RBC SED RATE AUTOMATED: CPT

## 2018-11-07 PROCEDURE — 82652 VIT D 1 25-DIHYDROXY: CPT

## 2018-11-07 PROCEDURE — 86060 ANTISTREPTOLYSIN O TITER: CPT

## 2018-11-07 PROCEDURE — 82306 VITAMIN D 25 HYDROXY: CPT

## 2018-11-07 PROCEDURE — 84550 ASSAY OF BLOOD/URIC ACID: CPT

## 2018-11-07 PROCEDURE — 86140 C-REACTIVE PROTEIN: CPT

## 2018-11-07 PROCEDURE — 85384 FIBRINOGEN ACTIVITY: CPT

## 2018-11-07 PROCEDURE — 86038 ANTINUCLEAR ANTIBODIES: CPT

## 2018-11-07 PROCEDURE — 36415 COLL VENOUS BLD VENIPUNCTURE: CPT

## 2018-11-07 PROCEDURE — 86430 RHEUMATOID FACTOR TEST QUAL: CPT

## 2018-11-08 LAB
1,25(OH)2D3 SERPL-MCNC: 39.3 PG/ML (ref 19.9–79.3)
25(OH)D3+25(OH)D2 SERPL-MCNC: 70 NG/ML (ref 30–100)
ANA SER QL: NEGATIVE
ASO AB SERPL-ACNC: 33 IU/ML (ref 0–200)
RHEUMATOID FACT SER QL LA: NEGATIVE

## 2018-11-14 ENCOUNTER — HOSPITAL ENCOUNTER (OUTPATIENT)
Dept: PHYSICAL THERAPY | Age: 58
Discharge: HOME | End: 2018-11-14
Payer: COMMERCIAL

## 2018-11-14 PROCEDURE — 97161 PT EVAL LOW COMPLEX 20 MIN: CPT

## 2018-11-14 NOTE — THERAPY EVALUATION
Hermann Ferreirasabrina  : 1960  Primary: Ali Schaumann*  Secondary:  [address] at [place] [id number], [address] [place] [address]  [phone]   [phone]       OUTPATIENT PHYSICAL THERAPY:Initial Assessment 2018   ICD-10: Treatment Diagnosis: Cervicalgia M54.2, Low back pain 54.5  Precautions/Allergies:   Tramadol   MD Orders: Evaluate and treat MEDICAL/REFERRING DIAGNOSIS:  Low back pain [M54.5]  Fibromyalgia [M79.7]   DATE OF ONSET: Chronic  REFERRING PHYSICIAN: Leilani Washburn MD  RETURN PHYSICIAN APPOINTMENT: 18     INITIAL ASSESSMENT:  Ms. Andrew Noriega presents with tightness in both cervical and thoracic spine limiting functional mobility and contributing to pain. Pt may benefit from therapy at this time as she demonstrated good tolerance to PROM into cervical spine and expressed interest to return to PLOF pain free. PROBLEM LIST (Impacting functional limitations):  1. Decreased Strength  2. Increased Pain  3. Decreased Activity Tolerance  4. Decreased Flexibility/Joint Mobility INTERVENTIONS PLANNED:  1. Home Exercise Program (HEP)  2. Neuromuscular Re-education/Strengthening  3. Therapeutic Exercise/Strengthening   TREATMENT PLAN:  Effective Dates: 2018 TO 2019 (90 days). Frequency/Duration: 2 times a week for 90 Day(s)  GOALS: (Goals have been discussed and agreed upon with patient.)  Discharge Goals: Time Frame: 19  1. Pt to demonstrate cervical spine AROM painfree WNL. 2. Pt to tolerate walking for up to 20 minutes without increased pain/difficulty. 3. Pt to report decreased disability as per NDI with a score between 1-10. Rehabilitation Potential For Stated Goals: Good  Regarding Sultana Velásquez's therapy, I certify that the treatment plan above will be carried out by a therapist or under their direction.   Thank you for this referral,  Farzana Pompa, PT                 The information in this section was collected on 11/14/18 (except where otherwise noted). HISTORY:   History of Present Injury/Illness (Reason for Referral): Pt is a 62year old female with long history of low back pain and recent exacerbation of neck pain and right arm symptoms. Pt denies headaches however she has numbness/tingling along the right side of her neck and into her right arm. Pt states that she does not have any numbness or tingling into her LE's however they ache all the time. Pt stated that her sleep is disturbed 4 times a night and that she has not had a headache since a procedure she had done a few weeks ago, radiofrequency denervation. Pt stated that immediately after the denervation she was unable to move or resume normal activities and just recently has been able to move with increased ease. Pt stated that she has had a CT scan in the past which revealed bulging disc in the neck as well as arthritis in the neck,shoulder,and back. Pt wishes to return to normal activities such as walking for leisure and returning to a gym. Pt reports pain at worst to be 10/10 which is worsened with walking,standing, and bending and alleviated with heat, soaking, and resting. Past Medical History/Comorbidities: Pacemaker,arthritis    Social History/Living Environment: Pt lives in a one story home with her      Prior Level of Function/Work/Activity: Pt is independent with all ADLs and ambulation however has had days in which she needs assistance to walk due to increased pain. Dominant Side:         RIGHT     Ambulatory/Rehab Services H2 Model Falls Risk Assessment    Risk Factors:       No Risk Factors Identified Ability to Rise from Chair:       (0)  Ability to rise in a single movement    Falls Prevention Plan:       No modifications necessary   Total: (5 or greater = High Risk): 0    ©2010 [employer]. All Rights Reserved. [facility] Patent #[id number].  Federal Law prohibits the replication, distribution or use without written permission from [facility]     Current Medications:  Prevacol,trintellix,ambien,biest,topiramate,protonix,baclofen,xanax,duexis,vitamin IB arnav, D3, rem fresh, Co Q10, fiber, hair, skin, and nails with biotin      Date Last Reviewed:  11/14/2018   Number of Personal Factors/Comorbidities that affect the Plan of Care: 0: LOW COMPLEXITY   EXAMINATION:   Observation/Orthostatic Postural Assessment:  Moderate forward head    ROM:   AROM(PROM) in degrees Right Left   Shoulder flexion 120 160   Shoulder abduction (palm down) 110 165       Cervical spine AROM  AROM (% normal)   Flexion 10   Extension 10   Right rotation 10   Left rotation 10   Right lateral flexion 10   Left lateral flexion 10         Strength:   Manual Muscle Test (*/5) Right Left   Shoulder flexion 3 3   Shoulder extension 3 3   Shoulder abduction 3 3   Shoulder adduction 3 3   Shoulder ER 3 3   Shoulder IR 3 3   Upper trapezius 3 3   Middle trapezius 3 3   Lower trapezius 3 3   Rhomboids 3 3   Serratus Anterior 3 3   Elbow flexion 3 3   Elbow extension 3 3                     Strength Left (out of 5) Right (out of 5) N/T   Hip Flexion (L1,2) 3 3    Knee Extension (L3,4) 3 3    Ankle Dorsiflexion (L4) 3 3    Great Toe Extension (L5) 3 3    Ankle Plantarflexion (S1) 3 3    Knee Flexion (S1,2) 3 3      Other tests/comments: Tenderness along thoracic spine spinous process, right upper trapezius    Body Structures Involved:  1. Muscles Body Functions Affected:  1. Sensory/Pain  2. Neuromusculoskeletal Activities and Participation Affected:  1. Mobility  2. Self Care  3. Domestic Life   Number of elements (examined above) that affect the Plan of Care: 4+: HIGH COMPLEXITY   CLINICAL PRESENTATION:   Presentation: Stable and uncomplicated: LOW COMPLEXITY   CLINICAL DECISION MAKING:   Outcome Measure:    Tool Used: Neck Disability Index (NDI)  Score:  Initial: 27/50  Most Recent: X/50 (Date: -- )     Tool Used: Modified Oswestry Low Back Pain Questionnaire  Score:  Initial: 27/50  Most Recent: X/50 (Date: -- )     Medical Necessity:   · Patient demonstrates good rehab potential due to higher previous functional level. Reason for Services/Other Comments:  · Patient will benefit from therapy at this time to meet goals established above. Use of outcome tool(s) and clinical judgement create a POC that gives a: Clear prediction of patient's progress: LOW COMPLEXITY            TREATMENT:   (In addition to Assessment/Re-Assessment sessions the following treatments were rendered)  Pre-treatment Symptoms/Complaints: \"My whole body hurts, neck,back, and right arm\"  Pain: Initial:   Pain Intensity 1: 5  Pain Location 1: Neck, Back  Post Session:    Unchanged     Assessment only today, no treatment provided. Treatment/Session Assessment:    · Response to Treatment:  Pt tolerated initial evaluation and was eager to start treatment. Pt educated on proper sleeping positions utilizing pillows under arm. Pt given verbal HEP to work on improving flexibility of the cervical spine. · Compliance with Program/Exercises: Will assess as treatment progresses. · Recommendations/Intent for next treatment session: \"Next visit will focus on advancements to more challenging activities\".   Total Treatment Duration:  Initial evaluation: 60 minutes  PT Patient Time In/Time Out  Time In: 1100  Time Out: [address], PT    Future Appointments   Date Time Provider Jennifer Chinchilla   11/20/2018 11:00 AM Tayla Burns [facility]   11/27/2018 11:00 AM Minh Goode PT [facility]

## 2018-11-15 LAB — VISC SER: 1.6 REL.SALINE (ref 1.6–1.9)

## 2018-11-16 LAB
25(OH)D2 SERPL-MCNC: <1 NG/ML
25(OH)D3 SERPL-MCNC: 67 NG/ML
25(OH)D3+25(OH)D2 SERPL-MCNC: 68 NG/ML

## 2018-11-20 ENCOUNTER — HOSPITAL ENCOUNTER (OUTPATIENT)
Dept: PHYSICAL THERAPY | Age: 58
Discharge: HOME | End: 2018-11-20
Payer: COMMERCIAL

## 2018-11-20 PROCEDURE — 97140 MANUAL THERAPY 1/> REGIONS: CPT

## 2018-11-20 NOTE — THERAPY EVALUATION
Fallon Cierra  : 1960  Primary: Ana Maria Moore*  Secondary:  [address] at [address], [address] [place] [address]  [phone]   [phone]       OUTPATIENT PHYSICAL THERAPY:Daily Note 2018   ICD-10: Treatment Diagnosis: Cervicalgia M54.2, Low back pain 54.5  Precautions/Allergies:   Tramadol   MD Orders: Evaluate and treat MEDICAL/REFERRING DIAGNOSIS:  Low back pain [M54.5]  Fibromyalgia [M79.7]   DATE OF ONSET: Chronic  REFERRING PHYSICIAN: Ross Etienne MD  RETURN PHYSICIAN APPOINTMENT: 18     INITIAL ASSESSMENT:  Ms. Klarissa Herrera presents with tightness in both cervical and thoracic spine limiting functional mobility and contributing to pain. Pt may benefit from therapy at this time as she demonstrated good tolerance to PROM into cervical spine and expressed interest to return to PLOF pain free. PROBLEM LIST (Impacting functional limitations):  1. Decreased Strength  2. Increased Pain  3. Decreased Activity Tolerance  4. Decreased Flexibility/Joint Mobility INTERVENTIONS PLANNED:  1. Home Exercise Program (HEP)  2. Neuromuscular Re-education/Strengthening  3. Therapeutic Exercise/Strengthening   TREATMENT PLAN:  Effective Dates: 2018 TO 2019 (90 days). Frequency/Duration: 2 times a week for 90 Day(s)  GOALS: (Goals have been discussed and agreed upon with patient.)  Discharge Goals: Time Frame: 19  1. Pt to demonstrate cervical spine AROM painfree WNL. 2. Pt to tolerate walking for up to 20 minutes without increased pain/difficulty. 3. Pt to report decreased disability as per NDI with a score between 1-10. Rehabilitation Potential For Stated Goals: Good  Regarding Sultanalove Velásquez's therapy, I certify that the treatment plan above will be carried out by a therapist or under their direction.   Thank you for this referral,  [place] Gomez PT                 The information in this section was collected on 18 (except where otherwise noted). HISTORY:   History of Present Injury/Illness (Reason for Referral): Pt is a 62year old female with long history of low back pain and recent exacerbation of neck pain and right arm symptoms. Pt denies headaches however she has numbness/tingling along the right side of her neck and into her right arm. Pt states that she does not have any numbness or tingling into her LE's however they ache all the time. Pt stated that her sleep is disturbed 4 times a night and that she has not had a headache since a procedure she had done a few weeks ago, radiofrequency denervation. Pt stated that immediately after the denervation she was unable to move or resume normal activities and just recently has been able to move with increased ease. Pt stated that she has had a CT scan in the past which revealed bulging disc in the neck as well as arthritis in the neck,shoulder,and back. Pt wishes to return to normal activities such as walking for leisure and returning to a gym. Pt reports pain at worst to be 10/10 which is worsened with walking,standing, and bending and alleviated with heat, soaking, and resting. Past Medical History/Comorbidities: Pacemaker,arthritis    Social History/Living Environment: Pt lives in a one story home with her      Prior Level of Function/Work/Activity: Pt is independent with all ADLs and ambulation however has had days in which she needs assistance to walk due to increased pain. Dominant Side:         RIGHT     Ambulatory/Rehab Services H2 Model Falls Risk Assessment    Risk Factors:       No Risk Factors Identified Ability to Rise from Chair:       (0)  Ability to rise in a single movement    Falls Prevention Plan:       No modifications necessary   Total: (5 or greater = High Risk): 0    ©2010 [facility] [employer]. All Rights Reserved. [facility] Patent #[id number].  Federal Law prohibits the replication, distribution or use without written permission from [facility]  [employer]     Current Medications:  Prevacol,trintellix,ambien,biest,topiramate,protonix,baclofen,xanax,duexis,vitamin IB arnav, D3, rem fresh, Co Q10, fiber, hair, skin, and nails with biotin      Date Last Reviewed:  11/20/2018   Number of Personal Factors/Comorbidities that affect the Plan of Care: 0: LOW COMPLEXITY   EXAMINATION:   Observation/Orthostatic Postural Assessment:  Moderate forward head    ROM:   AROM(PROM) in degrees Right Left   Shoulder flexion 120 160   Shoulder abduction (palm down) 110 165       Cervical spine AROM  AROM (% normal)   Flexion 10   Extension 10   Right rotation 10   Left rotation 10   Right lateral flexion 10   Left lateral flexion 10         Strength:   Manual Muscle Test (*/5) Right Left   Shoulder flexion 3 3   Shoulder extension 3 3   Shoulder abduction 3 3   Shoulder adduction 3 3   Shoulder ER 3 3   Shoulder IR 3 3   Upper trapezius 3 3   Middle trapezius 3 3   Lower trapezius 3 3   Rhomboids 3 3   Serratus Anterior 3 3   Elbow flexion 3 3   Elbow extension 3 3                     Strength Left (out of 5) Right (out of 5) N/T   Hip Flexion (L1,2) 3 3    Knee Extension (L3,4) 3 3    Ankle Dorsiflexion (L4) 3 3    Great Toe Extension (L5) 3 3    Ankle Plantarflexion (S1) 3 3    Knee Flexion (S1,2) 3 3      Other tests/comments: Tenderness along thoracic spine spinous process, right upper trapezius    Body Structures Involved:  1. Muscles Body Functions Affected:  1. Sensory/Pain  2. Neuromusculoskeletal Activities and Participation Affected:  1. Mobility  2. Self Care  3. Domestic Life   Number of elements (examined above) that affect the Plan of Care: 4+: HIGH COMPLEXITY   CLINICAL PRESENTATION:   Presentation: Stable and uncomplicated: LOW COMPLEXITY   CLINICAL DECISION MAKING:   Outcome Measure:    Tool Used: Neck Disability Index (NDI)  Score:  Initial: 27/50  Most Recent: X/50 (Date: -- )     Tool Used: Modified Oswestry Low Back Pain Questionnaire  Score: Initial: 27/50  Most Recent: X/50 (Date: -- )     Medical Necessity:   · Patient demonstrates good rehab potential due to higher previous functional level. Reason for Services/Other Comments:  · Patient will benefit from therapy at this time to meet goals established above. Use of outcome tool(s) and clinical judgement create a POC that gives a: Clear prediction of patient's progress: LOW COMPLEXITY            TREATMENT:   (In addition to Assessment/Re-Assessment sessions the following treatments were rendered)  Pre-treatment Symptoms/Complaints: \"I think I been doing too much and feel really sore\"  Pain: Initial:   Pain Intensity 1: 4  Pain Location 1: Neck, Back  Post Session:    Unchanged     Manual Therapy (   60 minutes  ): Manual techniques to facilitate improved motion and decreased pain. (Used abbreviations: MET - muscle energy technique; PNF - proprioceptive neuromuscular facilitation; NMR - neuromuscular re-education; a/p - anterior to posterior; p/a - posterior to anterior)   · With pt comfortably seated:  · PROM to cervical spine to all planes of motion  · Manual stretching to levator scapulae and upper trapezius  · STM to upper trapezius and levator scapulae  · Gentle PA mobilizations grade II to thoracic and cervical spine  · Suboccipital release and manual distraction with pt seated. Treatment/Session Assessment:    · Response to Treatment:   Pt with improved cervical spine A/PROM today, right shoulder PROM continues to be painful. Pt with some increased tenderness to anterior neck region. · Compliance with Program/Exercises: Will assess as treatment progresses. · Recommendations/Intent for next treatment session: \"Next visit will focus on advancements to more challenging activities\".   Total Treatment Duration:  60 minutes  PT Patient Time In/Time Out  Time In: 1100  Time Out: [address], PT    Future Appointments   Date Time Provider Jennifer Chinchilla   11/27/2018 11:00 AM [facility]

## 2018-11-27 ENCOUNTER — HOSPITAL ENCOUNTER (OUTPATIENT)
Dept: PHYSICAL THERAPY | Age: 58
Discharge: HOME | End: 2018-11-27
Payer: COMMERCIAL

## 2018-11-27 PROCEDURE — 97140 MANUAL THERAPY 1/> REGIONS: CPT

## 2018-11-27 NOTE — PROGRESS NOTES
Romina Holman  : 1960  Primary: Maria Eugenia Monahan*  Secondary:  [address] at [address]  [phone]   [phone]       OUTPATIENT PHYSICAL THERAPY:Daily Note 2018   ICD-10: Treatment Diagnosis: Cervicalgia M54.2, Low back pain 54.5  Precautions/Allergies:   Tramadol   MD Orders: Evaluate and treat MEDICAL/REFERRING DIAGNOSIS:  Low back pain [M54.5]  Fibromyalgia [M79.7]   DATE OF ONSET: Chronic  REFERRING PHYSICIAN: Natalee Gregory MD  RETURN PHYSICIAN APPOINTMENT: 18     INITIAL ASSESSMENT:  Ms. Amira Flores presents with tightness in both cervical and thoracic spine limiting functional mobility and contributing to pain. Pt may benefit from therapy at this time as she demonstrated good tolerance to PROM into cervical spine and expressed interest to return to PLOF pain free. PROBLEM LIST (Impacting functional limitations):  1. Decreased Strength  2. Increased Pain  3. Decreased Activity Tolerance  4. Decreased Flexibility/Joint Mobility INTERVENTIONS PLANNED:  1. Home Exercise Program (HEP)  2. Neuromuscular Re-education/Strengthening  3. Therapeutic Exercise/Strengthening   TREATMENT PLAN:  Effective Dates: 2018 TO 2019 (90 days). Frequency/Duration: 2 times a week for 90 Day(s)  GOALS: (Goals have been discussed and agreed upon with patient.)  Discharge Goals: Time Frame: 19  1. Pt to demonstrate cervical spine AROM painfree WNL. 2. Pt to tolerate walking for up to 20 minutes without increased pain/difficulty. 3. Pt to report decreased disability as per NDI with a score between 1-10. Rehabilitation Potential For Stated Goals: Good  Regarding Sultana Raymundo's therapy, I certify that the treatment plan above will be carried out by a therapist or under their direction.   Thank you for this referral,  Javier Cazares PT                 The information in this section was collected on 18 (except where otherwise noted). HISTORY:   History of Present Injury/Illness (Reason for Referral): Pt is a 62year old female with long history of low back pain and recent exacerbation of neck pain and right arm symptoms. Pt denies headaches however she has numbness/tingling along the right side of her neck and into her right arm. Pt states that she does not have any numbness or tingling into her LE's however they ache all the time. Pt stated that her sleep is disturbed 4 times a night and that she has not had a headache since a procedure she had done a few weeks ago, radiofrequency denervation. Pt stated that immediately after the denervation she was unable to move or resume normal activities and just recently has been able to move with increased ease. Pt stated that she has had a CT scan in the past which revealed bulging disc in the neck as well as arthritis in the neck,shoulder,and back. Pt wishes to return to normal activities such as walking for leisure and returning to a gym. Pt reports pain at worst to be 10/10 which is worsened with walking,standing, and bending and alleviated with heat, soaking, and resting. Past Medical History/Comorbidities: Pacemaker,arthritis    Social History/Living Environment: Pt lives in a one story home with her      Prior Level of Function/Work/Activity: Pt is independent with all ADLs and ambulation however has had days in which she needs assistance to walk due to increased pain. Dominant Side:         RIGHT     Ambulatory/Rehab Services H2 Model Falls Risk Assessment    Risk Factors:       No Risk Factors Identified Ability to Rise from Chair:       (0)  Ability to rise in a single movement    Falls Prevention Plan:       No modifications necessary   Total: (5 or greater = High Risk): 0    ©2010 [facility] of [employer]. All Rights Reserved. [facility] Patent #[id number].  Federal Law prohibits the replication, distribution or use without written permission from [facility]  [employer]     Current Medications:  Prevacol,trintellix,ambien,biest,topiramate,protonix,baclofen,xanax,duexis,vitamin IB arnav, D3, rem fresh, Co Q10, fiber, hair, skin, and nails with biotin      Date Last Reviewed:  11/27/2018   Number of Personal Factors/Comorbidities that affect the Plan of Care: 0: LOW COMPLEXITY   EXAMINATION:   Observation/Orthostatic Postural Assessment:  Moderate forward head    ROM:   AROM(PROM) in degrees Right Left   Shoulder flexion 120 160   Shoulder abduction (palm down) 110 165       Cervical spine AROM  AROM (% normal)   Flexion 10   Extension 10   Right rotation 10   Left rotation 10   Right lateral flexion 10   Left lateral flexion 10         Strength:   Manual Muscle Test (*/5) Right Left   Shoulder flexion 3 3   Shoulder extension 3 3   Shoulder abduction 3 3   Shoulder adduction 3 3   Shoulder ER 3 3   Shoulder IR 3 3   Upper trapezius 3 3   Middle trapezius 3 3   Lower trapezius 3 3   Rhomboids 3 3   Serratus Anterior 3 3   Elbow flexion 3 3   Elbow extension 3 3                     Strength Left (out of 5) Right (out of 5) N/T   Hip Flexion (L1,2) 3 3    Knee Extension (L3,4) 3 3    Ankle Dorsiflexion (L4) 3 3    Great Toe Extension (L5) 3 3    Ankle Plantarflexion (S1) 3 3    Knee Flexion (S1,2) 3 3      Other tests/comments: Tenderness along thoracic spine spinous process, right upper trapezius    Body Structures Involved:  1. Muscles Body Functions Affected:  1. Sensory/Pain  2. Neuromusculoskeletal Activities and Participation Affected:  1. Mobility  2. Self Care  3. Domestic Life   Number of elements (examined above) that affect the Plan of Care: 4+: HIGH COMPLEXITY   CLINICAL PRESENTATION:   Presentation: Stable and uncomplicated: LOW COMPLEXITY   CLINICAL DECISION MAKING:   Outcome Measure:    Tool Used: Neck Disability Index (NDI)  Score:  Initial: 27/50  Most Recent: X/50 (Date: -- )     Tool Used: Modified Oswestry Low Back Pain Questionnaire  Score: Initial: 27/50  Most Recent: X/50 (Date: -- )     Medical Necessity:   · Patient demonstrates good rehab potential due to higher previous functional level. Reason for Services/Other Comments:  · Patient will benefit from therapy at this time to meet goals established above. Use of outcome tool(s) and clinical judgement create a POC that gives a: Clear prediction of patient's progress: LOW COMPLEXITY            TREATMENT:   (In addition to Assessment/Re-Assessment sessions the following treatments were rendered)  Pre-treatment Symptoms/Complaints: Pt reports bilateral shoulder pain and legs hurting her today. Pt did state that she has more mobility in her neck. Pain: Initial:   Pain Intensity 1: 5  Pain Location 1: Back, Neck, Shoulder  Post Session:    Unchanged     Manual Therapy (   60 minutes  ): Manual techniques to facilitate improved motion and decreased pain. (Used abbreviations: MET - muscle energy technique; PNF - proprioceptive neuromuscular facilitation; NMR - neuromuscular re-education; a/p - anterior to posterior; p/a - posterior to anterior)   · With pt comfortably seated:  · PROM to cervical spine to all planes of motion; PROM right shoulder  · Manual stretching to levator scapulae and upper trapezius  · STM to upper trapezius and levator scapulae  · Gentle PA mobilizations grade II to thoracic and cervical spine  · Suboccipital release and manual distraction with pt seated. Treatment/Session Assessment:    · Response to Treatment:   Improved right shoulder PROM today. .   · Compliance with Program/Exercises: Will assess as treatment progresses. · Recommendations/Intent for next treatment session: \"Next visit will focus on advancements to more challenging activities\".   Total Treatment Duration:  60 minutes  PT Patient Time In/Time Out  Time In: 1100  Time Out: [address], PT    Future Appointments   Date Time Provider Jennifer Chinchilla   12/4/2018  8:00 AM Do Galindo, PT [facility]   12/11/2018  2:00 PM Nahid Sulphur, PT [facility]   12/17/2018  1:00 PM Nahid Sabino, PT [facility]   12/19/2018 11:00 AM Nahid Sulphur, PT [facility]   12/28/2018  8:00 AM Nahid Sulphur, PT [facility]   1/2/2019  1:00 PM Nahid Sulphur, PT [facility]   1/4/2019  1:00 PM Nahid Sabino, PT [facility]   1/9/2019 11:00 AM Nahid Sulphur, PT [facility]   1/11/2019  1:00 PM Nahid Benavidesa, PT [facility]   1/16/2019 11:00 AM Nahid Sabino, PT [facility]   1/18/2019  1:00 PM Nahid Sulphur, PT [facility]   1/23/2019 11:00 AM Nahid Benavidesa, PT [facility]   1/25/2019 11:00 AM Nahid Benavidesa, PT [facility]

## 2018-11-30 ENCOUNTER — HOSPITAL ENCOUNTER (OUTPATIENT)
Dept: PHYSICAL THERAPY | Age: 58
Discharge: HOME | End: 2018-11-30
Payer: COMMERCIAL

## 2018-11-30 ENCOUNTER — APPOINTMENT (OUTPATIENT)
Dept: PHYSICAL THERAPY | Age: 58
End: 2018-11-30
Payer: COMMERCIAL

## 2018-11-30 PROCEDURE — 97140 MANUAL THERAPY 1/> REGIONS: CPT

## 2018-11-30 NOTE — PROGRESS NOTES
Fallon Bearyosvany  : 1960  Primary: Ana Maria Moore*  Secondary:  [address] at [address], [address] [place] [address]  [phone]   [phone]       OUTPATIENT PHYSICAL THERAPY:Daily Note 2018   ICD-10: Treatment Diagnosis: Cervicalgia M54.2, Low back pain 54.5  Precautions/Allergies:   Tramadol   MD Orders: Evaluate and treat MEDICAL/REFERRING DIAGNOSIS:  Low back pain [M54.5]  Fibromyalgia [M79.7]   DATE OF ONSET: Chronic  REFERRING PHYSICIAN: Ross Etienne MD  RETURN PHYSICIAN APPOINTMENT: 18     INITIAL ASSESSMENT:  Ms. Klarissa Herrera presents with tightness in both cervical and thoracic spine limiting functional mobility and contributing to pain. Pt may benefit from therapy at this time as she demonstrated good tolerance to PROM into cervical spine and expressed interest to return to PLOF pain free. PROBLEM LIST (Impacting functional limitations):  1. Decreased Strength  2. Increased Pain  3. Decreased Activity Tolerance  4. Decreased Flexibility/Joint Mobility INTERVENTIONS PLANNED:  1. Home Exercise Program (HEP)  2. Neuromuscular Re-education/Strengthening  3. Therapeutic Exercise/Strengthening   TREATMENT PLAN:  Effective Dates: 2018 TO 2019 (90 days). Frequency/Duration: 2 times a week for 90 Day(s)  GOALS: (Goals have been discussed and agreed upon with patient.)  Discharge Goals: Time Frame: 19  1. Pt to demonstrate cervical spine AROM painfree WNL. 2. Pt to tolerate walking for up to 20 minutes without increased pain/difficulty. 3. Pt to report decreased disability as per NDI with a score between 1-10. Rehabilitation Potential For Stated Goals: Good  Regarding Sultanalove Velásquez's therapy, I certify that the treatment plan above will be carried out by a therapist or under their direction.   Thank you for this referral,  [place] Gomez PT                 The information in this section was collected on 18 (except where otherwise noted). HISTORY:   History of Present Injury/Illness (Reason for Referral): Pt is a 62year old female with long history of low back pain and recent exacerbation of neck pain and right arm symptoms. Pt denies headaches however she has numbness/tingling along the right side of her neck and into her right arm. Pt states that she does not have any numbness or tingling into her LE's however they ache all the time. Pt stated that her sleep is disturbed 4 times a night and that she has not had a headache since a procedure she had done a few weeks ago, radiofrequency denervation. Pt stated that immediately after the denervation she was unable to move or resume normal activities and just recently has been able to move with increased ease. Pt stated that she has had a CT scan in the past which revealed bulging disc in the neck as well as arthritis in the neck,shoulder,and back. Pt wishes to return to normal activities such as walking for leisure and returning to a gym. Pt reports pain at worst to be 10/10 which is worsened with walking,standing, and bending and alleviated with heat, soaking, and resting. Past Medical History/Comorbidities: Pacemaker,arthritis    Social History/Living Environment: Pt lives in a one story home with her      Prior Level of Function/Work/Activity: Pt is independent with all ADLs and ambulation however has had days in which she needs assistance to walk due to increased pain. Dominant Side:         RIGHT     Ambulatory/Rehab Services H2 Model Falls Risk Assessment    Risk Factors:       No Risk Factors Identified Ability to Rise from Chair:       (0)  Ability to rise in a single movement    Falls Prevention Plan:       No modifications necessary   Total: (5 or greater = High Risk): 0    ©2010 [facility] of [employer]. All Rights Reserved. [facility] Patent #[id number].  Federal Law prohibits the replication, distribution or use without written permission from [facility]  [facility]     Current Medications:  Prevacol,trintellix,ambien,biest,topiramate,protonix,baclofen,xanax,duexis,vitamin IB arnav, D3, rem fresh, Co Q10, fiber, hair, skin, and nails with biotin      Date Last Reviewed:  11/30/2018   Number of Personal Factors/Comorbidities that affect the Plan of Care: 0: LOW COMPLEXITY   EXAMINATION:   Observation/Orthostatic Postural Assessment:  Moderate forward head    ROM:   AROM(PROM) in degrees Right Left   Shoulder flexion 120 160   Shoulder abduction (palm down) 110 165       Cervical spine AROM  AROM (% normal)   Flexion 10   Extension 10   Right rotation 10   Left rotation 10   Right lateral flexion 10   Left lateral flexion 10         Strength:   Manual Muscle Test (*/5) Right Left   Shoulder flexion 3 3   Shoulder extension 3 3   Shoulder abduction 3 3   Shoulder adduction 3 3   Shoulder ER 3 3   Shoulder IR 3 3   Upper trapezius 3 3   Middle trapezius 3 3   Lower trapezius 3 3   Rhomboids 3 3   Serratus Anterior 3 3   Elbow flexion 3 3   Elbow extension 3 3                     Strength Left (out of 5) Right (out of 5) N/T   Hip Flexion (L1,2) 3 3    Knee Extension (L3,4) 3 3    Ankle Dorsiflexion (L4) 3 3    Great Toe Extension (L5) 3 3    Ankle Plantarflexion (S1) 3 3    Knee Flexion (S1,2) 3 3      Other tests/comments: Tenderness along thoracic spine spinous process, right upper trapezius    Body Structures Involved:  1. Muscles Body Functions Affected:  1. Sensory/Pain  2. Neuromusculoskeletal Activities and Participation Affected:  1. Mobility  2. Self Care  3. Domestic Life   Number of elements (examined above) that affect the Plan of Care: 4+: HIGH COMPLEXITY   CLINICAL PRESENTATION:   Presentation: Stable and uncomplicated: LOW COMPLEXITY   CLINICAL DECISION MAKING:   Outcome Measure:    Tool Used: Neck Disability Index (NDI)  Score:  Initial: 27/50  Most Recent: X/50 (Date: -- )     Tool Used: Modified Oswestry Low Back Pain Questionnaire  Score: Initial: 27/50  Most Recent: X/50 (Date: -- )     Medical Necessity:   · Patient demonstrates good rehab potential due to higher previous functional level. Reason for Services/Other Comments:  · Patient will benefit from therapy at this time to meet goals established above. Use of outcome tool(s) and clinical judgement create a POC that gives a: Clear prediction of patient's progress: LOW COMPLEXITY            TREATMENT:   (In addition to Assessment/Re-Assessment sessions the following treatments were rendered)  Pre-treatment Symptoms/Complaints: Pt stated that she is having some left sided clavicle pain and associated it to being intimate with her  and doing a lot more around the house yesterday. Pt did state that since starting therapy she is able to get more restful sleep. Pain: Initial:   Pain Intensity 1: 4  Pain Location 1: Back, Neck, Shoulder  Post Session:    Unchanged     Manual Therapy (   60 minutes  ): Manual techniques to facilitate improved motion and decreased pain. (Used abbreviations: MET - muscle energy technique; PNF - proprioceptive neuromuscular facilitation; NMR - neuromuscular re-education; a/p - anterior to posterior; p/a - posterior to anterior)   · With pt comfortably seated:  · PROM to cervical spine to all planes of motion; PROM right shoulder  · Manual stretching to levator scapulae and upper trapezius  · STM to upper trapezius and levator scapulae  · Gentle PA mobilizations grade II to thoracic and cervical spine  · Suboccipital release and manual distraction with pt seated. · With pt in supine:  · Manual traction to cervical spine with legs elevated x 8 minutes      Treatment/Session Assessment:    · Response to Treatment:  Pt tolerated manual traction today in seated and in supine. Pt with point tenderness along insertion of trapezius at distal clavicle. Pt educated on deep breathing and not utilizing accessory muscles as much.    · Compliance with Program/Exercises: Will assess as treatment progresses. · Recommendations/Intent for next treatment session: \"Next visit will focus on advancements to more challenging activities\".   Total Treatment Duration:  60 minutes  PT Patient Time In/Time Out  Time In: 1040  Time Out: [address], PT    Future Appointments   Date Time Provider Jennifer Chinchilla   12/3/2018 10:00 AM Alee Mcintosh, PT [facility]   12/5/2018  9:00 AM Alee Mcintosh, PT [facility]   12/11/2018  2:00 PM Alee Mcintosh, PT [facility]   12/17/2018  1:00 PM Alee Mcintosh, PT [facility]   12/19/2018 11:00 AM Alee Mcintosh, PT [facility]   12/28/2018  8:00 AM Alee Mcintosh, PT [facility]   1/2/2019  1:00 PM Alee Mcintosh, PT [facility]   1/4/2019  1:00 PM Alee Mcintosh, PT [facility]   1/9/2019 11:00 AM Alee Mcintosh, PT [facility]   1/11/2019  1:00 PM Alee Mcintosh, PT [facility]   1/16/2019 11:00 AM Alee Mcintosh, PT [facility]   1/18/2019  1:00 PM Alee Mcintosh, PT [facility]   1/23/2019 11:00 AM Alee Mcintosh, PT [facility]   1/25/2019 11:00 AM Alee Mcintosh, PT [facility]

## 2018-12-03 ENCOUNTER — HOSPITAL ENCOUNTER (OUTPATIENT)
Dept: PHYSICAL THERAPY | Age: 58
Discharge: HOME | End: 2018-12-03
Payer: COMMERCIAL

## 2018-12-03 PROCEDURE — 97140 MANUAL THERAPY 1/> REGIONS: CPT

## 2018-12-03 NOTE — PROGRESS NOTES
Rodolfo Terrell  : 1960  Primary: Pamela Alston*  Secondary:  [address] at [address], [address] [place] [address]  [phone]   [phone]       OUTPATIENT PHYSICAL THERAPY:Daily Note 12/3/2018   ICD-10: Treatment Diagnosis: Cervicalgia M54.2, Low back pain 54.5  Precautions/Allergies:   Tramadol   MD Orders: Evaluate and treat MEDICAL/REFERRING DIAGNOSIS:  Low back pain [M54.5]  Fibromyalgia [M79.7]   DATE OF ONSET: Chronic  REFERRING PHYSICIAN: Melva Eng MD  RETURN PHYSICIAN APPOINTMENT: 18     INITIAL ASSESSMENT:  Ms. Tristian Stanford presents with tightness in both cervical and thoracic spine limiting functional mobility and contributing to pain. Pt may benefit from therapy at this time as she demonstrated good tolerance to PROM into cervical spine and expressed interest to return to PLOF pain free. PROBLEM LIST (Impacting functional limitations):  1. Decreased Strength  2. Increased Pain  3. Decreased Activity Tolerance  4. Decreased Flexibility/Joint Mobility INTERVENTIONS PLANNED:  1. Home Exercise Program (HEP)  2. Neuromuscular Re-education/Strengthening  3. Therapeutic Exercise/Strengthening   TREATMENT PLAN:  Effective Dates: 2018 TO 2019 (90 days). Frequency/Duration: 2 times a week for 90 Day(s)  GOALS: (Goals have been discussed and agreed upon with patient.)  Discharge Goals: Time Frame: 19  1. Pt to demonstrate cervical spine AROM painfree WNL. 2. Pt to tolerate walking for up to 20 minutes without increased pain/difficulty. 3. Pt to report decreased disability as per NDI with a score between 1-10. Rehabilitation Potential For Stated Goals: Good  Regarding Sultana Velásquez's therapy, I certify that the treatment plan above will be carried out by a therapist or under their direction.   Thank you for this referral,  Wesly Brewer PT                 The information in this section was collected on 18 (except where otherwise noted). HISTORY:   History of Present Injury/Illness (Reason for Referral): Pt is a 62year old female with long history of low back pain and recent exacerbation of neck pain and right arm symptoms. Pt denies headaches however she has numbness/tingling along the right side of her neck and into her right arm. Pt states that she does not have any numbness or tingling into her LE's however they ache all the time. Pt stated that her sleep is disturbed 4 times a night and that she has not had a headache since a procedure she had done a few weeks ago, radiofrequency denervation. Pt stated that immediately after the denervation she was unable to move or resume normal activities and just recently has been able to move with increased ease. Pt stated that she has had a CT scan in the past which revealed bulging disc in the neck as well as arthritis in the neck,shoulder,and back. Pt wishes to return to normal activities such as walking for leisure and returning to a gym. Pt reports pain at worst to be 10/10 which is worsened with walking,standing, and bending and alleviated with heat, soaking, and resting. Past Medical History/Comorbidities: Pacemaker,arthritis    Social History/Living Environment: Pt lives in a one story home with her      Prior Level of Function/Work/Activity: Pt is independent with all ADLs and ambulation however has had days in which she needs assistance to walk due to increased pain. Dominant Side:         RIGHT     Ambulatory/Rehab Services H2 Model Falls Risk Assessment    Risk Factors:       No Risk Factors Identified Ability to Rise from Chair:       (0)  Ability to rise in a single movement    Falls Prevention Plan:       No modifications necessary   Total: (5 or greater = High Risk): 0    ©2010 [facility] [employer]. All Rights Reserved. [facility] Patent #[id number].  Federal Law prohibits the replication, distribution or use without written permission from [facility] of [facility]     Current Medications:  Prevacol,trintellix,ambien,biest,topiramate,protonix,baclofen,xanax,duexis,vitamin IB arnav, D3, rem fresh, Co Q10, fiber, hair, skin, and nails with biotin      Date Last Reviewed:  12/3/2018   Number of Personal Factors/Comorbidities that affect the Plan of Care: 0: LOW COMPLEXITY   EXAMINATION:   Observation/Orthostatic Postural Assessment:  Moderate forward head    ROM:   AROM(PROM) in degrees Right Left   Shoulder flexion 120 160   Shoulder abduction (palm down) 110 165       Cervical spine AROM  AROM (% normal)   Flexion 10   Extension 10   Right rotation 10   Left rotation 10   Right lateral flexion 10   Left lateral flexion 10         Strength:   Manual Muscle Test (*/5) Right Left   Shoulder flexion 3 3   Shoulder extension 3 3   Shoulder abduction 3 3   Shoulder adduction 3 3   Shoulder ER 3 3   Shoulder IR 3 3   Upper trapezius 3 3   Middle trapezius 3 3   Lower trapezius 3 3   Rhomboids 3 3   Serratus Anterior 3 3   Elbow flexion 3 3   Elbow extension 3 3                     Strength Left (out of 5) Right (out of 5) N/T   Hip Flexion (L1,2) 3 3    Knee Extension (L3,4) 3 3    Ankle Dorsiflexion (L4) 3 3    Great Toe Extension (L5) 3 3    Ankle Plantarflexion (S1) 3 3    Knee Flexion (S1,2) 3 3      Other tests/comments: Tenderness along thoracic spine spinous process, right upper trapezius    Body Structures Involved:  1. Muscles Body Functions Affected:  1. Sensory/Pain  2. Neuromusculoskeletal Activities and Participation Affected:  1. Mobility  2. Self Care  3. Domestic Life   Number of elements (examined above) that affect the Plan of Care: 4+: HIGH COMPLEXITY   CLINICAL PRESENTATION:   Presentation: Stable and uncomplicated: LOW COMPLEXITY   CLINICAL DECISION MAKING:   Outcome Measure:    Tool Used: Neck Disability Index (NDI)  Score:  Initial: 27/50  Most Recent: X/50 (Date: -- )     Tool Used: Modified Oswestry Low Back Pain Questionnaire  Score: Initial: 27/50  Most Recent: X/50 (Date: -- )     Medical Necessity:   · Patient demonstrates good rehab potential due to higher previous functional level. Reason for Services/Other Comments:  · Patient will benefit from therapy at this time to meet goals established above. Use of outcome tool(s) and clinical judgement create a POC that gives a: Clear prediction of patient's progress: LOW COMPLEXITY            TREATMENT:   (In addition to Assessment/Re-Assessment sessions the following treatments were rendered)  Pre-treatment Symptoms/Complaints: Pt stated that she has had awful pain the past few days and that she has sharp pain into her occipital region today. Pt stated that she almost canceled. Pt to see referring physician next week and agreeable to hold therapy until she seems him. Pain: Initial:   Pain Intensity 1: 7  Pain Location 1: Back, Neck, Shoulder  Post Session:    Unchanged     Manual Therapy (   45 minutes  ): Manual techniques to facilitate improved motion and decreased pain. (Used abbreviations: MET - muscle energy technique; PNF - proprioceptive neuromuscular facilitation; NMR - neuromuscular re-education; a/p - anterior to posterior; p/a - posterior to anterior)   · With pt comfortably seated:  · PROM to cervical spine to all planes of motion; PROM right shoulder  · Manual stretching to levator scapulae and upper trapezius  · STM to upper trapezius and levator scapulae  · Gentle PA mobilizations grade II to thoracic and cervical spine    Treatment/Session Assessment:    · Response to Treatment:  Pt with poor tolerance to manual therapy and encouraged to put therapy on hold until referring physician is seen due to no goals being met at this time but only increased pain. · Compliance with Program/Exercises: Will assess as treatment progresses. · Recommendations/Intent for next treatment session: \"Next visit will focus on advancements to more challenging activities\".   Total Treatment Duration:  45 minutes  PT Patient Time In/Time Out  Time In: 1000  Time Out: Collin 74, PT    Future Appointments   Date Time Provider Jennifer Chinchilla   12/17/2018  1:00 PM Tee Crane, PT [facility]   12/19/2018 11:00 AM Tee Crane, PT [facility]   12/28/2018  8:00 AM Tee Crane, PT [facility]   1/2/2019  1:00 PM Tee Crane, PT [facility]   1/4/2019  1:00 PM Tee Crane, PT [facility]   1/9/2019 11:00 AM Tee Crane, PT [facility]   1/11/2019  1:00 PM Tee Crane, PT [facility]   1/16/2019 11:00 AM Tee Crane, PT [facility]   1/18/2019  1:00 PM Tee Crane, PT [facility]   1/23/2019 11:00 AM Tee Crane, PT [facility]   1/25/2019 11:00 AM Tee Crane, PT [facility]

## 2018-12-04 ENCOUNTER — APPOINTMENT (OUTPATIENT)
Dept: PHYSICAL THERAPY | Age: 58
End: 2018-12-04
Payer: COMMERCIAL

## 2018-12-05 ENCOUNTER — APPOINTMENT (OUTPATIENT)
Dept: PHYSICAL THERAPY | Age: 58
End: 2018-12-05
Payer: COMMERCIAL

## 2018-12-11 ENCOUNTER — APPOINTMENT (OUTPATIENT)
Dept: PHYSICAL THERAPY | Age: 58
End: 2018-12-11
Payer: COMMERCIAL

## 2018-12-17 ENCOUNTER — HOSPITAL ENCOUNTER (OUTPATIENT)
Dept: PHYSICAL THERAPY | Age: 58
Discharge: HOME | End: 2018-12-17
Payer: COMMERCIAL

## 2018-12-17 NOTE — THERAPY DISCHARGE
Carter Getting  : 1960  Primary: Kun Marrow*  Secondary:  [address] at [address], [address] [place] [address]  [phone]   [phone]       OUTPATIENT PHYSICAL THERAPY:Discharge Summary 2018   ICD-10: Treatment Diagnosis: Cervicalgia M54.2, Low back pain 54.5  Precautions/Allergies:   Tramadol   MD Orders: Evaluate and treat MEDICAL/REFERRING DIAGNOSIS:  Low back pain [M54.5]  Fibromyalgia [M79.7]   DATE OF ONSET: Chronic  REFERRING PHYSICIAN: Rowdy Mcclellan MD  RETURN PHYSICIAN APPOINTMENT: 18: Pt was seen for 5 visits with no change in overall symptoms and/or pain. Pt recently had 6 injections in her cervical spine and called to be discharged from therapy at this time. Unable to comment if goals were met as pt did not return. INITIAL ASSESSMENT:  Ms. Vita Cortez presents with tightness in both cervical and thoracic spine limiting functional mobility and contributing to pain. Pt may benefit from therapy at this time as she demonstrated good tolerance to PROM into cervical spine and expressed interest to return to PLOF pain free. PROBLEM LIST (Impacting functional limitations):  1. Decreased Strength  2. Increased Pain  3. Decreased Activity Tolerance  4. Decreased Flexibility/Joint Mobility INTERVENTIONS PLANNED:  1. Home Exercise Program (HEP)  2. Neuromuscular Re-education/Strengthening  3. Therapeutic Exercise/Strengthening   TREATMENT PLAN:  Effective Dates: 2018 TO 2019 (90 days). Frequency/Duration: 2 times a week for 90 Day(s)  GOALS: (Goals have been discussed and agreed upon with patient.)  Discharge Goals: Time Frame: 19  1. Pt to demonstrate cervical spine AROM painfree WNL. 2. Pt to tolerate walking for up to 20 minutes without increased pain/difficulty. 3. Pt to report decreased disability as per NDI with a score between 1-10.   Rehabilitation Potential For Stated Goals: Good  Regarding Sultana Velásquez's therapy, I certify that the treatment plan above will be carried out by a therapist or under their direction.   Thank you for this referral,  Carol Nicole, PT

## 2018-12-19 ENCOUNTER — APPOINTMENT (OUTPATIENT)
Dept: PHYSICAL THERAPY | Age: 58
End: 2018-12-19
Payer: COMMERCIAL

## 2018-12-28 ENCOUNTER — APPOINTMENT (OUTPATIENT)
Dept: PHYSICAL THERAPY | Age: 58
End: 2018-12-28
Payer: COMMERCIAL

## 2019-01-02 ENCOUNTER — APPOINTMENT (OUTPATIENT)
Dept: PHYSICAL THERAPY | Age: 59
End: 2019-01-02

## 2019-01-04 ENCOUNTER — APPOINTMENT (OUTPATIENT)
Dept: PHYSICAL THERAPY | Age: 59
End: 2019-01-04

## 2019-01-09 ENCOUNTER — APPOINTMENT (OUTPATIENT)
Dept: PHYSICAL THERAPY | Age: 59
End: 2019-01-09

## 2019-01-11 ENCOUNTER — APPOINTMENT (OUTPATIENT)
Dept: PHYSICAL THERAPY | Age: 59
End: 2019-01-11

## 2019-01-16 ENCOUNTER — APPOINTMENT (OUTPATIENT)
Dept: PHYSICAL THERAPY | Age: 59
End: 2019-01-16

## 2019-01-18 ENCOUNTER — APPOINTMENT (OUTPATIENT)
Dept: PHYSICAL THERAPY | Age: 59
End: 2019-01-18

## 2019-01-23 ENCOUNTER — APPOINTMENT (OUTPATIENT)
Dept: PHYSICAL THERAPY | Age: 59
End: 2019-01-23

## 2019-01-25 ENCOUNTER — APPOINTMENT (OUTPATIENT)
Dept: PHYSICAL THERAPY | Age: 59
End: 2019-01-25

## 2020-11-28 ENCOUNTER — HOSPITAL ENCOUNTER (EMERGENCY)
Age: 60
Discharge: ANOTHER HOSPITAL | End: 2020-11-28
Attending: EMERGENCY MEDICINE
Payer: MEDICARE

## 2020-11-28 VITALS
RESPIRATION RATE: 16 BRPM | DIASTOLIC BLOOD PRESSURE: 55 MMHG | WEIGHT: 143 LBS | HEIGHT: 66 IN | BODY MASS INDEX: 22.98 KG/M2 | TEMPERATURE: 98 F | SYSTOLIC BLOOD PRESSURE: 113 MMHG | OXYGEN SATURATION: 100 % | HEART RATE: 59 BPM

## 2020-11-28 DIAGNOSIS — N99.820 POSTOPERATIVE HEMORRHAGE INVOLVING GENITOURINARY SYSTEM FOLLOWING GENITOURINARY PROCEDURE: Primary | ICD-10-CM

## 2020-11-28 LAB
ANION GAP SERPL CALC-SCNC: 6 MMOL/L (ref 7–16)
BASOPHILS # BLD: 0.1 K/UL (ref 0–0.2)
BASOPHILS NFR BLD: 1 % (ref 0–2)
BUN SERPL-MCNC: 18 MG/DL (ref 8–23)
CALCIUM SERPL-MCNC: 8.6 MG/DL (ref 8.3–10.4)
CHLORIDE SERPL-SCNC: 113 MMOL/L (ref 98–107)
CO2 SERPL-SCNC: 24 MMOL/L (ref 21–32)
CREAT SERPL-MCNC: 0.86 MG/DL (ref 0.6–1)
DIFFERENTIAL METHOD BLD: ABNORMAL
EOSINOPHIL # BLD: 0.1 K/UL (ref 0–0.8)
EOSINOPHIL NFR BLD: 1 % (ref 0.5–7.8)
ERYTHROCYTE [DISTWIDTH] IN BLOOD BY AUTOMATED COUNT: 13 % (ref 11.9–14.6)
GLUCOSE SERPL-MCNC: 87 MG/DL (ref 65–100)
HCT VFR BLD AUTO: 37.5 % (ref 35.8–46.3)
HGB BLD-MCNC: 12.1 G/DL (ref 11.7–15.4)
IMM GRANULOCYTES # BLD AUTO: 0 K/UL (ref 0–0.5)
IMM GRANULOCYTES NFR BLD AUTO: 0 % (ref 0–5)
LYMPHOCYTES # BLD: 2.7 K/UL (ref 0.5–4.6)
LYMPHOCYTES NFR BLD: 26 % (ref 13–44)
MCH RBC QN AUTO: 30.6 PG (ref 26.1–32.9)
MCHC RBC AUTO-ENTMCNC: 32.3 G/DL (ref 31.4–35)
MCV RBC AUTO: 94.9 FL (ref 79.6–97.8)
MONOCYTES # BLD: 0.7 K/UL (ref 0.1–1.3)
MONOCYTES NFR BLD: 7 % (ref 4–12)
NEUTS SEG # BLD: 6.7 K/UL (ref 1.7–8.2)
NEUTS SEG NFR BLD: 66 % (ref 43–78)
NRBC # BLD: 0 K/UL (ref 0–0.2)
PLATELET # BLD AUTO: 273 K/UL (ref 150–450)
PMV BLD AUTO: 9.5 FL (ref 9.4–12.3)
POTASSIUM SERPL-SCNC: 3.3 MMOL/L (ref 3.5–5.1)
RBC # BLD AUTO: 3.95 M/UL (ref 4.05–5.2)
SODIUM SERPL-SCNC: 143 MMOL/L (ref 136–145)
WBC # BLD AUTO: 10.2 K/UL (ref 4.3–11.1)

## 2020-11-28 PROCEDURE — 74011250637 HC RX REV CODE- 250/637: Performed by: EMERGENCY MEDICINE

## 2020-11-28 PROCEDURE — 99284 EMERGENCY DEPT VISIT MOD MDM: CPT

## 2020-11-28 PROCEDURE — 85025 COMPLETE CBC W/AUTO DIFF WBC: CPT

## 2020-11-28 PROCEDURE — 80048 BASIC METABOLIC PNL TOTAL CA: CPT

## 2020-11-28 RX ORDER — HYDROCODONE BITARTRATE AND ACETAMINOPHEN 5; 325 MG/1; MG/1
1 TABLET ORAL ONCE
Status: COMPLETED | OUTPATIENT
Start: 2020-11-28 | End: 2020-11-28

## 2020-11-28 RX ADMIN — HYDROCODONE BITARTRATE AND ACETAMINOPHEN 1 TABLET: 5; 325 TABLET ORAL at 18:21

## 2020-11-28 NOTE — ED TRIAGE NOTES
Pt had bladder and rectum surgery on 11/10. Pt states she is bleeding but unsure where blood is coming from, thinks may be vaginal blood.  Pt states called Primary care dr who told her to come to ED

## 2020-11-28 NOTE — ED PROVIDER NOTES
[facility]       HPI:    66-year-old female presents to the ED with complaint of either vaginal or rectal bleeding. Patient had an anterior posterior repair secondary to pelvic floor relaxation with Dr. Demetra Gunn of [facility] 18 days ago. Patient has had some sharp pelvic pain over the past few days. She called into the OB/GYN office and they called in some [address]. Patient reports no relief with ibuprofen at home    Bleeding started today. Patient states they called the on-call OB/GYN and they were directed to come to the emergency department. She thinks her bleeding is vaginal and not rectal.  She is not on anticoagulants    REVIEW OF SYSTEMS     ROS Negative Except as Listed in HPI    PAST MEDICAL HISTORY     No past medical history on file. No past surgical history on file. Social History     Socioeconomic History    Marital status:      Spouse name: Not on file    Number of children: Not on file    Years of education: Not on file    Highest education level: Not on file     Prior to Admission Medications   Prescriptions Last Dose Informant Patient Reported? Taking?   diazePAM (VALIUM) 2 mg tablet   No No   Sig: Take one tablet every 8-12 hours as needed for muscle spasms   ibuprofen-famotidine (DUEXIS) 800-26.6 mg tab   Yes No   Sig: Take  by mouth three (3) times daily. Facility-Administered Medications: None     Allergies   Allergen Reactions    Tramadol Other (comments)     Pt states medication gives her a headache        PHYSICAL EXAM       Vitals:    11/28/20 1804   BP: 117/75   Pulse: 70   Resp: 16   Temp: 97.8 °F (36.6 °C)   SpO2: 98%    Vital signs were reviewed. Physical Exam  Genitourinary:     Comments: Limited external exam.  No external rashes noted. Patient does have dark venous blood trickling from the vaginal vault.          MEDICAL DECISION MAKING       Initial Impression and Treatment Plan  Nontoxic hemodynamically stable appearing 80-year-old who is having postoperative bleeding after anterior posterior repair 18 days ago. She is having oozing/trickling of venous blood from the vaginal vault. Will check basic labs and discuss case with her on-call OB/GYN         Recent Results (from the past 8 hour(s))   METABOLIC PANEL, BASIC    Collection Time: 11/28/20  6:24 PM   Result Value Ref Range    Sodium 143 136 - 145 mmol/L    Potassium 3.3 (L) 3.5 - 5.1 mmol/L    Chloride 113 (H) 98 - 107 mmol/L    CO2 24 21 - 32 mmol/L    Anion gap 6 (L) 7 - 16 mmol/L    Glucose 87 65 - 100 mg/dL    BUN 18 8 - 23 MG/DL    Creatinine 0.86 0.6 - 1.0 MG/DL    GFR est AA >60 >60 ml/min/1.73m2    GFR est non-AA >60 >60 ml/min/1.73m2    Calcium 8.6 8.3 - 10.4 MG/DL   CBC WITH AUTOMATED DIFF    Collection Time: 11/28/20  6:24 PM   Result Value Ref Range    WBC 10.2 4.3 - 11.1 K/uL    RBC 3.95 (L) 4.05 - 5.2 M/uL    HGB 12.1 11.7 - 15.4 g/dL    HCT 37.5 35.8 - 46.3 %    MCV 94.9 79.6 - 97.8 FL    MCH 30.6 26.1 - 32.9 PG    MCHC 32.3 31.4 - 35.0 g/dL    RDW 13.0 11.9 - 14.6 %    PLATELET 340 446 - 601 K/uL    MPV 9.5 9.4 - 12.3 FL    ABSOLUTE NRBC 0.00 0.0 - 0.2 K/uL    DF AUTOMATED      NEUTROPHILS 66 43 - 78 %    LYMPHOCYTES 26 13 - 44 %    MONOCYTES 7 4.0 - 12.0 %    EOSINOPHILS 1 0.5 - 7.8 %    BASOPHILS 1 0.0 - 2.0 %    IMMATURE GRANULOCYTES 0 0.0 - 5.0 %    ABS. NEUTROPHILS 6.7 1.7 - 8.2 K/UL    ABS. LYMPHOCYTES 2.7 0.5 - 4.6 K/UL    ABS. MONOCYTES 0.7 0.1 - 1.3 K/UL    ABS. EOSINOPHILS 0.1 0.0 - 0.8 K/UL    ABS. BASOPHILS 0.1 0.0 - 0.2 K/UL    ABS. IMM. GRANS. 0.0 0.0 - 0.5 K/UL         No results found. Medications - No data to display        Procedures    Update notes  6:29 PM-RN attempted to contact [id number] [facility],[address] but apparently they do not have anyone on-call for GYN on the weekends. They recommended we contact them again during normal business hours which is 36 hours away from now.   Clearly this is suboptimal.  Will wait for labs to result and then figure out disposition. 7:33 PM-discussed case with Dr. Jennifer Ball, on-call for OB/GYN at [facility].  States that I feel the patient needs to be evaluated tonight then the patient would have to come to [facility] emergency department for him to evaluate her. Discussed case with the patient who agrees to head over to [facility] ED.    7:49 PM- D/w Dr Jony Briones, ED [facility]. Accepts in transfer to ED and Dr Jennifer Ball to eval.       Disposition:    Transfer    Diagnosis:     ICD-10-CM ICD-9-CM   1. Postoperative hemorrhage involving genitourinary system following genitourinary procedure  N99.820 998.11         __________________________________________________________      Please note, this chart was dictated using Dragon dictation, voice recognition software. While efforts were made to correct any transcription errors, some may inadvertently remain. Please forgive punctuation and typographic/voice recognition errors. Please contact me with any questions concerns or for clarification of documentation.

## 2020-11-29 NOTE — ED NOTES
TRANSFER - OUT REPORT:    Verbal report given to April Menendez RN (name) on Lucia Cano  being transferred to ED(unit) for routine progression of care       Report consisted of patients Situation, Background, Assessment and   Recommendations(SBAR). Information from the following report(s) SBAR, ED Summary, [facility] and Recent Results was reviewed with the receiving nurse. Lines:   Peripheral IV 11/28/20 Left;Upper Forearm (Active)   Site Assessment Clean, dry, & intact 11/28/20 1835   Phlebitis Assessment 0 11/28/20 1835   Infiltration Assessment 0 11/28/20 1835   Dressing Status Clean, dry, & intact 11/28/20 1835        Opportunity for questions and clarification was provided.       Patient transported with:   n/a POV to [facility] ED

## 2023-01-11 ENCOUNTER — TRANSCRIBE ORDERS (OUTPATIENT)
Dept: SCHEDULING | Age: 63
End: 2023-01-11

## 2023-01-11 DIAGNOSIS — M54.12 CERVICAL RADICULOPATHY: Primary | ICD-10-CM

## 2023-01-17 ENCOUNTER — HOSPITAL ENCOUNTER (OUTPATIENT)
Dept: CT IMAGING | Age: 63
Discharge: HOME | End: 2023-01-19
Payer: MEDICARE

## 2023-01-17 DIAGNOSIS — M54.12 CERVICAL RADICULOPATHY: ICD-10-CM

## 2023-01-17 PROCEDURE — 72125 CT NECK SPINE W/O DYE: CPT

## 2024-03-16 ENCOUNTER — HOSPITAL ENCOUNTER (EMERGENCY)
Age: 64
Discharge: HOME | End: 2024-03-16
Attending: EMERGENCY MEDICINE
Payer: MEDICARE

## 2024-03-16 VITALS
RESPIRATION RATE: 16 BRPM | HEART RATE: 60 BPM | TEMPERATURE: 97.3 F | HEIGHT: 66 IN | BODY MASS INDEX: 23.3 KG/M2 | SYSTOLIC BLOOD PRESSURE: 116 MMHG | WEIGHT: 145 LBS | OXYGEN SATURATION: 100 % | DIASTOLIC BLOOD PRESSURE: 70 MMHG

## 2024-03-16 DIAGNOSIS — M54.50 ACUTE EXACERBATION OF CHRONIC LOW BACK PAIN: ICD-10-CM

## 2024-03-16 DIAGNOSIS — G89.29 ACUTE EXACERBATION OF CHRONIC LOW BACK PAIN: ICD-10-CM

## 2024-03-16 DIAGNOSIS — M54.32 SCIATICA OF LEFT SIDE: Primary | ICD-10-CM

## 2024-03-16 PROCEDURE — 99284 EMERGENCY DEPT VISIT MOD MDM: CPT

## 2024-03-16 PROCEDURE — 96372 THER/PROPH/DIAG INJ SC/IM: CPT

## 2024-03-16 PROCEDURE — 6360000002 HC RX W HCPCS: Performed by: EMERGENCY MEDICINE

## 2024-03-16 RX ORDER — BUPROPION HYDROCHLORIDE 150 MG/1
300 TABLET, EXTENDED RELEASE ORAL DAILY
COMMUNITY

## 2024-03-16 RX ORDER — HYDROCODONE BITARTRATE AND ACETAMINOPHEN 10; 325 MG/1; MG/1
TABLET ORAL
COMMUNITY
Start: 2023-04-24

## 2024-03-16 RX ORDER — ESTRADIOL 1 MG/1
1 TABLET ORAL DAILY
COMMUNITY
Start: 2023-04-13 | End: 2024-04-12

## 2024-03-16 RX ORDER — HYDROMORPHONE HYDROCHLORIDE 1 MG/ML
1 INJECTION, SOLUTION INTRAMUSCULAR; INTRAVENOUS; SUBCUTANEOUS
Status: COMPLETED | OUTPATIENT
Start: 2024-03-16 | End: 2024-03-16

## 2024-03-16 RX ORDER — DEXAMETHASONE SODIUM PHOSPHATE 10 MG/ML
10 INJECTION INTRAMUSCULAR; INTRAVENOUS
Status: COMPLETED | OUTPATIENT
Start: 2024-03-16 | End: 2024-03-16

## 2024-03-16 RX ORDER — SUMATRIPTAN 100 MG/1
100 TABLET, FILM COATED ORAL DAILY PRN
COMMUNITY
Start: 2023-12-19 | End: 2024-12-19

## 2024-03-16 RX ORDER — PREDNISONE 20 MG/1
40 TABLET ORAL DAILY
Qty: 10 TABLET | Refills: 0 | Status: SHIPPED | OUTPATIENT
Start: 2024-03-16 | End: 2024-03-21

## 2024-03-16 RX ORDER — ZOLPIDEM TARTRATE 10 MG/1
TABLET ORAL
COMMUNITY
Start: 2020-08-17

## 2024-03-16 RX ORDER — ARIPIPRAZOLE 15 MG/1
7.5 TABLET ORAL DAILY
COMMUNITY

## 2024-03-16 RX ORDER — PRAVASTATIN SODIUM 40 MG
TABLET ORAL
COMMUNITY
Start: 2020-04-06

## 2024-03-16 RX ORDER — DICLOFENAC SODIUM 75 MG/1
75 TABLET, DELAYED RELEASE ORAL 2 TIMES DAILY
COMMUNITY
Start: 2024-03-08

## 2024-03-16 RX ORDER — TOPIRAMATE 100 MG/1
TABLET, FILM COATED ORAL
COMMUNITY
Start: 2021-10-26

## 2024-03-16 RX ORDER — CLONIDINE HYDROCHLORIDE 0.1 MG/1
TABLET ORAL
COMMUNITY
Start: 2023-08-28

## 2024-03-16 RX ADMIN — HYDROMORPHONE HYDROCHLORIDE 1 MG: 1 INJECTION, SOLUTION INTRAMUSCULAR; INTRAVENOUS; SUBCUTANEOUS at 11:55

## 2024-03-16 RX ADMIN — DEXAMETHASONE SODIUM PHOSPHATE 10 MG: 10 INJECTION INTRAMUSCULAR; INTRAVENOUS at 11:55

## 2024-03-16 ASSESSMENT — PAIN DESCRIPTION - ORIENTATION: ORIENTATION: LEFT

## 2024-03-16 ASSESSMENT — PAIN SCALES - GENERAL
PAINLEVEL_OUTOF10: 10
PAINLEVEL_OUTOF10: 10

## 2024-03-16 ASSESSMENT — PAIN DESCRIPTION - LOCATION: LOCATION: BACK;LEG

## 2024-03-16 ASSESSMENT — PAIN - FUNCTIONAL ASSESSMENT
PAIN_FUNCTIONAL_ASSESSMENT: PREVENTS OR INTERFERES SOME ACTIVE ACTIVITIES AND ADLS
PAIN_FUNCTIONAL_ASSESSMENT: 0-10

## 2024-03-16 ASSESSMENT — PAIN DESCRIPTION - PAIN TYPE: TYPE: ACUTE PAIN

## 2024-03-16 ASSESSMENT — ENCOUNTER SYMPTOMS: BACK PAIN: 1

## 2024-03-16 ASSESSMENT — PAIN DESCRIPTION - DESCRIPTORS: DESCRIPTORS: SHARP;SHOOTING

## 2024-03-16 ASSESSMENT — LIFESTYLE VARIABLES: HOW MANY STANDARD DRINKS CONTAINING ALCOHOL DO YOU HAVE ON A TYPICAL DAY: PATIENT DOES NOT DRINK

## 2024-03-16 ASSESSMENT — PAIN DESCRIPTION - FREQUENCY: FREQUENCY: CONTINUOUS

## 2024-03-16 NOTE — ED TRIAGE NOTES
Pt c/o left lower back and leg pain x 2 weeks.  States that she has sciatica and is having a flare

## 2024-03-16 NOTE — DISCHARGE INSTRUCTIONS
Take medications as prescribed  Do not take them concomitantly with diclofenac  Follow-up with your primary care physician  Follow-up with pain management as well  Return to the ER for any new, worsening or life-threatening symptom

## 2024-03-16 NOTE — ED NOTES
I have reviewed discharge instructions with the patient and spouse.  The patient and spouse verbalized understanding.    Patient left ED via Discharge Method: wheelchair to Home with spouse    Opportunity for questions and clarification provided.       Patient given 1 scripts.         To continue your aftercare when you leave the hospital, you may receive an automated call from our care team to check in on how you are doing.  This is a free service and part of our promise to provide the best care and service to meet your aftercare needs.” If you have questions, or wish to unsubscribe from this service please call [phone].  Thank you for Choosing our [facility] Emergency Department.

## 2024-03-16 NOTE — ED PROVIDER NOTES
Emergency Department Provider Note       PCP: Mario Jauregui MD   Age: 63 y.o.   Sex: female     DISPOSITION Decision To Discharge 03/16/2024 01:30:19 PM       ICD-10-CM    1. Sciatica of left side  M54.32       2. Acute exacerbation of chronic low back pain  M54.50     G89.29           Medical Decision Making     Patient is neurovascularly intact distally.  Denies any incontinence of bowel or bladder.  Will treat symptomatically here and monitor symptoms    1:31 PM EDT  Symptomatically patient is improved.  Will discharge home.  Start oral course of prednisone     1 chronic illness with exacerbation.  Over the counter drug management performed.  Prescription drug management performed.  Parental controlled substances given in the ED.  Chronic medical problems impacting care include chronic pain.  Shared medical decision making was utilized in creating the patients health plan today.    I independently ordered and reviewed each unique test.  I reviewed external records: ED visit note from an outside group.  I reviewed external records: provider visit note from PCP.  I reviewed external records: provider visit note from outside specialist.  I reviewed external records: previous EKG including cardiologist interpretation.    I reviewed external records: previous lab results from outside ED.  I reviewed external records: previous imaging study including radiologist interpretation.                   History     With a known history of lumbar arthritis presents the ER complaint of worsening sciatica pain.  Patient reports of the past week she has had worsening pain in her back rating down her left leg only down to her toes.  Reports occasional numbness in her toes.  Was seen by her PCP recently for similar complaints and started on Voltaren.  States she also has history of cervical spine issues and sees pain management for this.  Denies any new trauma.  Denies any incontinence of bowel or bladder    The history is  (DELTASONE) 20 MG TABLET    Take 2 tablets by mouth daily for 5 days        Past Medical History:   Diagnosis Date    Bradycardia     Depression     Headache     Sciatica     Syncope         Past Surgical History:   Procedure Laterality Date    BACK SURGERY      CYSTOPLASTY      HYSTERECTOMY (CERVIX STATUS UNKNOWN)      PACEMAKER INSERTION      SPINAL CORD STIMULATOR IMPLANT          Social History     Socioeconomic History    Marital status:      Spouse name: None    Number of children: None    Years of education: None    Highest education level: None   Tobacco Use    Smoking status: Never    Smokeless tobacco: Never   Substance and Sexual Activity    Alcohol use: Not Currently    Drug use: Not Currently        Previous Medications    ARIPIPRAZOLE (ABILIFY) 15 MG TABLET    Take 0.5 tablets by mouth daily    BUPROPION (WELLBUTRIN SR) 150 MG EXTENDED RELEASE TABLET    Take 2 tablets by mouth daily    CLONIDINE (CATAPRES) 0.1 MG TABLET    1 tablet Orally twice a day for 5 days    DIAZEPAM (VALIUM) 2 MG TABLET    Take one tablet every 8-12 hours as needed for muscle spasms    DICLOFENAC (VOLTAREN) 75 MG EC TABLET    Take 1 tablet by mouth 2 times daily    ESTRADIOL (ESTRACE) 1 MG TABLET    Take 1 tablet by mouth daily    HYDROCODONE-ACETAMINOPHEN (NORCO)  MG PER TABLET        PRAVASTATIN (PRAVACHOL) 40 MG TABLET        SUMATRIPTAN (IMITREX) 100 MG TABLET    Take 1 tablet by mouth daily as needed    TOPIRAMATE (TOPAMAX) 100 MG TABLET        ZOLPIDEM (AMBIEN) 10 MG TABLET            No results found for any visits on 03/16/24.      No orders to display                No results for input(s): \"COVID19\" in the last 72 hours.     Voice dictation software was used during the making of this note.  This software is not perfect and grammatical and other typographical errors may be present.  This note has not been completely proofread for errors.     Rosales Bonner MD  03/16/24 [id number]

## 2025-07-03 ENCOUNTER — HOSPITAL ENCOUNTER (OUTPATIENT)
Dept: CT IMAGING | Age: 65
Discharge: HOME | End: 2025-07-05
Attending: ANESTHESIOLOGY
Payer: MEDICARE

## 2025-07-03 DIAGNOSIS — M54.12 BRACHIAL NEURITIS: ICD-10-CM

## 2025-07-03 PROCEDURE — 72125 CT NECK SPINE W/O DYE: CPT
